# Patient Record
Sex: MALE | Race: WHITE | NOT HISPANIC OR LATINO | Employment: OTHER | URBAN - METROPOLITAN AREA
[De-identification: names, ages, dates, MRNs, and addresses within clinical notes are randomized per-mention and may not be internally consistent; named-entity substitution may affect disease eponyms.]

---

## 2024-01-09 ENCOUNTER — HOSPITAL ENCOUNTER (INPATIENT)
Facility: HOSPITAL | Age: 69
LOS: 6 days | DRG: 871 | End: 2024-01-15
Attending: EMERGENCY MEDICINE | Admitting: INTERNAL MEDICINE
Payer: COMMERCIAL

## 2024-01-09 ENCOUNTER — APPOINTMENT (OUTPATIENT)
Dept: RADIOLOGY | Facility: HOSPITAL | Age: 69
DRG: 871 | End: 2024-01-09
Payer: COMMERCIAL

## 2024-01-09 ENCOUNTER — APPOINTMENT (OUTPATIENT)
Dept: CARDIOLOGY | Facility: HOSPITAL | Age: 69
DRG: 871 | End: 2024-01-09
Payer: COMMERCIAL

## 2024-01-09 DIAGNOSIS — N17.9 ACUTE KIDNEY INJURY (CMS-HCC): ICD-10-CM

## 2024-01-09 DIAGNOSIS — J18.9 PNEUMONIA OF RIGHT LOWER LOBE DUE TO INFECTIOUS ORGANISM: ICD-10-CM

## 2024-01-09 DIAGNOSIS — I48.91 ATRIAL FIBRILLATION WITH RVR (MULTI): ICD-10-CM

## 2024-01-09 DIAGNOSIS — J96.01 ACUTE RESPIRATORY FAILURE WITH HYPOXIA (MULTI): Primary | ICD-10-CM

## 2024-01-09 DIAGNOSIS — J10.1 INFLUENZA A: ICD-10-CM

## 2024-01-09 LAB
ABO GROUP (TYPE) IN BLOOD: NORMAL
ALBUMIN SERPL BCP-MCNC: 3.2 G/DL (ref 3.4–5)
ALP SERPL-CCNC: 44 U/L (ref 33–136)
ALT SERPL W P-5'-P-CCNC: 66 U/L (ref 10–52)
AMORPH CRY #/AREA UR COMP ASSIST: ABNORMAL /HPF
ANION GAP BLDV CALCULATED.4IONS-SCNC: 21 MMOL/L (ref 10–25)
ANION GAP SERPL CALC-SCNC: 28 MMOL/L (ref 10–20)
ANTIBODY SCREEN: NORMAL
APPEARANCE UR: ABNORMAL
APTT PPP: 27 SECONDS (ref 27–38)
AST SERPL W P-5'-P-CCNC: 165 U/L (ref 9–39)
B-OH-BUTYR SERPL-SCNC: 0.38 MMOL/L (ref 0.02–0.27)
BACTERIA #/AREA URNS AUTO: ABNORMAL /HPF
BASE EXCESS BLDA CALC-SCNC: -3.5 MMOL/L (ref -2–3)
BASE EXCESS BLDV CALC-SCNC: -5.1 MMOL/L (ref -2–3)
BASOPHILS # BLD MANUAL: 0 X10*3/UL (ref 0–0.1)
BASOPHILS NFR BLD MANUAL: 0 %
BILIRUB SERPL-MCNC: 2.9 MG/DL (ref 0–1.2)
BILIRUB UR STRIP.AUTO-MCNC: NEGATIVE MG/DL
BNP SERPL-MCNC: 243 PG/ML (ref 0–99)
BODY TEMPERATURE: 37 DEGREES CELSIUS
BODY TEMPERATURE: 37 DEGREES CELSIUS
BUN SERPL-MCNC: 64 MG/DL (ref 6–23)
BURR CELLS BLD QL SMEAR: ABNORMAL
CA-I BLDV-SCNC: 1.11 MMOL/L (ref 1.1–1.33)
CALCIUM SERPL-MCNC: 9.1 MG/DL (ref 8.6–10.3)
CARDIAC TROPONIN I PNL SERPL HS: 21 NG/L (ref 0–20)
CHLORIDE BLDV-SCNC: 96 MMOL/L (ref 98–107)
CHLORIDE SERPL-SCNC: 94 MMOL/L (ref 98–107)
CO2 SERPL-SCNC: 19 MMOL/L (ref 21–32)
COLOR UR: ABNORMAL
CREAT SERPL-MCNC: 2.06 MG/DL (ref 0.5–1.3)
CRITICAL CALL TIME: 115
CRITICAL CALLED BY: ABNORMAL
CRITICAL CALLED TO: ABNORMAL
CRITICAL READ BACK: ABNORMAL
DACRYOCYTES BLD QL SMEAR: ABNORMAL
EGFRCR SERPLBLD CKD-EPI 2021: 34 ML/MIN/1.73M*2
EOSINOPHIL # BLD MANUAL: 0 X10*3/UL (ref 0–0.7)
EOSINOPHIL NFR BLD MANUAL: 0 %
ERYTHROCYTE [DISTWIDTH] IN BLOOD BY AUTOMATED COUNT: 13.2 % (ref 11.5–14.5)
FLUAV RNA RESP QL NAA+PROBE: DETECTED
FLUBV RNA RESP QL NAA+PROBE: NOT DETECTED
GIANT PLATELETS BLD QL SMEAR: ABNORMAL
GLUCOSE BLD MANUAL STRIP-MCNC: 114 MG/DL (ref 74–99)
GLUCOSE BLD MANUAL STRIP-MCNC: 128 MG/DL (ref 74–99)
GLUCOSE BLD MANUAL STRIP-MCNC: 36 MG/DL (ref 74–99)
GLUCOSE BLD MANUAL STRIP-MCNC: 94 MG/DL (ref 74–99)
GLUCOSE BLDV-MCNC: 45 MG/DL (ref 74–99)
GLUCOSE SERPL-MCNC: 43 MG/DL (ref 74–99)
GLUCOSE UR STRIP.AUTO-MCNC: NEGATIVE MG/DL
GRAN CASTS #/AREA UR COMP ASSIST: ABNORMAL /LPF
HCO3 BLDA-SCNC: 22.1 MMOL/L (ref 22–26)
HCO3 BLDV-SCNC: 22.7 MMOL/L (ref 22–26)
HCT VFR BLD AUTO: 47.4 % (ref 41–52)
HCT VFR BLD EST: 36 % (ref 41–52)
HGB BLD-MCNC: 15.8 G/DL (ref 13.5–17.5)
HGB BLDV-MCNC: 12 G/DL (ref 13.5–17.5)
HOLD SPECIMEN: NORMAL
HYALINE CASTS #/AREA URNS AUTO: ABNORMAL /LPF
IMM GRANULOCYTES # BLD AUTO: 0.24 X10*3/UL (ref 0–0.7)
IMM GRANULOCYTES NFR BLD AUTO: 5.9 % (ref 0–0.9)
INHALED O2 CONCENTRATION: 100 %
INHALED O2 CONCENTRATION: 100 %
INR PPP: 1.5 (ref 0.9–1.1)
KETONES UR STRIP.AUTO-MCNC: NEGATIVE MG/DL
LACTATE BLDV-SCNC: 15.4 MMOL/L (ref 0.4–2)
LACTATE SERPL-SCNC: 10.8 MMOL/L (ref 0.4–2)
LACTATE SERPL-SCNC: 12.8 MMOL/L (ref 0.4–2)
LACTATE SERPL-SCNC: 5.5 MMOL/L (ref 0.4–2)
LACTATE SERPL-SCNC: 5.9 MMOL/L (ref 0.4–2)
LEUKOCYTE ESTERASE UR QL STRIP.AUTO: NEGATIVE
LYMPHOCYTES # BLD MANUAL: 0.49 X10*3/UL (ref 1.2–4.8)
LYMPHOCYTES NFR BLD MANUAL: 12 %
MAGNESIUM SERPL-MCNC: 3.72 MG/DL (ref 1.6–2.4)
MCH RBC QN AUTO: 31.7 PG (ref 26–34)
MCHC RBC AUTO-ENTMCNC: 33.3 G/DL (ref 32–36)
MCV RBC AUTO: 95 FL (ref 80–100)
METAMYELOCYTES # BLD MANUAL: 0.37 X10*3/UL
METAMYELOCYTES NFR BLD MANUAL: 9 %
MONOCYTES # BLD MANUAL: 0.86 X10*3/UL (ref 0.1–1)
MONOCYTES NFR BLD MANUAL: 21 %
MUCOUS THREADS #/AREA URNS AUTO: ABNORMAL /LPF
MYELOCYTES # BLD MANUAL: 0.04 X10*3/UL
MYELOCYTES NFR BLD MANUAL: 1 %
NEUTROPHILS # BLD MANUAL: 2.3 X10*3/UL (ref 1.2–7.7)
NEUTS BAND # BLD MANUAL: 1.64 X10*3/UL (ref 0–0.7)
NEUTS BAND NFR BLD MANUAL: 40 %
NEUTS SEG # BLD MANUAL: 0.66 X10*3/UL (ref 1.2–7)
NEUTS SEG NFR BLD MANUAL: 16 %
NEUTS VAC BLD QL SMEAR: PRESENT
NITRITE UR QL STRIP.AUTO: NEGATIVE
NRBC BLD-RTO: 5.1 /100 WBCS (ref 0–0)
OXYHGB MFR BLDA: 86.6 % (ref 94–98)
OXYHGB MFR BLDV: 23.8 % (ref 45–75)
PCO2 BLDA: 41 MM HG (ref 38–42)
PCO2 BLDV: 53 MM HG (ref 41–51)
PH BLDA: 7.34 PH (ref 7.38–7.42)
PH BLDV: 7.24 PH (ref 7.33–7.43)
PH UR STRIP.AUTO: 5 [PH]
PHOSPHATE SERPL-MCNC: 6.9 MG/DL (ref 2.5–4.9)
PLATELET # BLD AUTO: 210 X10*3/UL (ref 150–450)
PLATELET CLUMP BLD QL SMEAR: PRESENT
PO2 BLDA: 58 MM HG (ref 85–95)
PO2 BLDV: 24 MM HG (ref 35–45)
POTASSIUM BLDV-SCNC: 5.3 MMOL/L (ref 3.5–5.3)
POTASSIUM SERPL-SCNC: 4.3 MMOL/L (ref 3.5–5.3)
PROCALCITONIN SERPL-MCNC: 88.77 NG/ML
PROT SERPL-MCNC: 6.7 G/DL (ref 6.4–8.2)
PROT UR STRIP.AUTO-MCNC: ABNORMAL MG/DL
PROTHROMBIN TIME: 16.5 SECONDS (ref 9.8–12.8)
RBC # BLD AUTO: 4.98 X10*6/UL (ref 4.5–5.9)
RBC # UR STRIP.AUTO: NEGATIVE /UL
RBC #/AREA URNS AUTO: ABNORMAL /HPF
RBC MORPH BLD: ABNORMAL
RH FACTOR (ANTIGEN D): NORMAL
SAO2 % BLDA: 89 % (ref 94–100)
SAO2 % BLDV: 24 % (ref 45–75)
SARS-COV-2 RNA RESP QL NAA+PROBE: NOT DETECTED
SODIUM BLDV-SCNC: 134 MMOL/L (ref 136–145)
SODIUM SERPL-SCNC: 137 MMOL/L (ref 136–145)
SP GR UR STRIP.AUTO: 1.02
SPECIMEN DRAWN FROM PATIENT: ABNORMAL
TOTAL CELLS COUNTED BLD: 100
UROBILINOGEN UR STRIP.AUTO-MCNC: 4 MG/DL
VARIANT LYMPHS # BLD MANUAL: 0.04 X10*3/UL (ref 0–0.5)
VARIANT LYMPHS NFR BLD: 1 %
WBC # BLD AUTO: 4.1 X10*3/UL (ref 4.4–11.3)
WBC #/AREA URNS AUTO: ABNORMAL /HPF

## 2024-01-09 PROCEDURE — 85027 COMPLETE CBC AUTOMATED: CPT | Performed by: EMERGENCY MEDICINE

## 2024-01-09 PROCEDURE — 96375 TX/PRO/DX INJ NEW DRUG ADDON: CPT

## 2024-01-09 PROCEDURE — 87899 AGENT NOS ASSAY W/OPTIC: CPT | Mod: PARLAB | Performed by: INTERNAL MEDICINE

## 2024-01-09 PROCEDURE — 85007 BL SMEAR W/DIFF WBC COUNT: CPT | Performed by: EMERGENCY MEDICINE

## 2024-01-09 PROCEDURE — 84100 ASSAY OF PHOSPHORUS: CPT | Performed by: EMERGENCY MEDICINE

## 2024-01-09 PROCEDURE — 87449 NOS EACH ORGANISM AG IA: CPT | Mod: PARLAB | Performed by: INTERNAL MEDICINE

## 2024-01-09 PROCEDURE — 82010 KETONE BODYS QUAN: CPT | Performed by: EMERGENCY MEDICINE

## 2024-01-09 PROCEDURE — 84484 ASSAY OF TROPONIN QUANT: CPT | Performed by: EMERGENCY MEDICINE

## 2024-01-09 PROCEDURE — 2500000004 HC RX 250 GENERAL PHARMACY W/ HCPCS (ALT 636 FOR OP/ED)

## 2024-01-09 PROCEDURE — 81001 URINALYSIS AUTO W/SCOPE: CPT | Performed by: EMERGENCY MEDICINE

## 2024-01-09 PROCEDURE — 74176 CT ABD & PELVIS W/O CONTRAST: CPT

## 2024-01-09 PROCEDURE — 2500000002 HC RX 250 W HCPCS SELF ADMINISTERED DRUGS (ALT 637 FOR MEDICARE OP, ALT 636 FOR OP/ED)

## 2024-01-09 PROCEDURE — 2500000002 HC RX 250 W HCPCS SELF ADMINISTERED DRUGS (ALT 637 FOR MEDICARE OP, ALT 636 FOR OP/ED): Performed by: STUDENT IN AN ORGANIZED HEALTH CARE EDUCATION/TRAINING PROGRAM

## 2024-01-09 PROCEDURE — 87636 SARSCOV2 & INF A&B AMP PRB: CPT | Performed by: EMERGENCY MEDICINE

## 2024-01-09 PROCEDURE — 87040 BLOOD CULTURE FOR BACTERIA: CPT | Mod: PARLAB,59 | Performed by: EMERGENCY MEDICINE

## 2024-01-09 PROCEDURE — 2500000004 HC RX 250 GENERAL PHARMACY W/ HCPCS (ALT 636 FOR OP/ED): Performed by: STUDENT IN AN ORGANIZED HEALTH CARE EDUCATION/TRAINING PROGRAM

## 2024-01-09 PROCEDURE — 94640 AIRWAY INHALATION TREATMENT: CPT

## 2024-01-09 PROCEDURE — 84132 ASSAY OF SERUM POTASSIUM: CPT | Performed by: EMERGENCY MEDICINE

## 2024-01-09 PROCEDURE — 36415 COLL VENOUS BLD VENIPUNCTURE: CPT | Performed by: STUDENT IN AN ORGANIZED HEALTH CARE EDUCATION/TRAINING PROGRAM

## 2024-01-09 PROCEDURE — 94667 MNPJ CHEST WALL 1ST: CPT

## 2024-01-09 PROCEDURE — 96374 THER/PROPH/DIAG INJ IV PUSH: CPT

## 2024-01-09 PROCEDURE — 71045 X-RAY EXAM CHEST 1 VIEW: CPT | Performed by: RADIOLOGY

## 2024-01-09 PROCEDURE — 83605 ASSAY OF LACTIC ACID: CPT | Performed by: EMERGENCY MEDICINE

## 2024-01-09 PROCEDURE — 36415 COLL VENOUS BLD VENIPUNCTURE: CPT | Performed by: EMERGENCY MEDICINE

## 2024-01-09 PROCEDURE — 2020000001 HC ICU ROOM DAILY

## 2024-01-09 PROCEDURE — 94660 CPAP INITIATION&MGMT: CPT

## 2024-01-09 PROCEDURE — 36415 COLL VENOUS BLD VENIPUNCTURE: CPT | Performed by: INTERNAL MEDICINE

## 2024-01-09 PROCEDURE — 83605 ASSAY OF LACTIC ACID: CPT | Performed by: STUDENT IN AN ORGANIZED HEALTH CARE EDUCATION/TRAINING PROGRAM

## 2024-01-09 PROCEDURE — 74176 CT ABD & PELVIS W/O CONTRAST: CPT | Performed by: RADIOLOGY

## 2024-01-09 PROCEDURE — 36600 WITHDRAWAL OF ARTERIAL BLOOD: CPT

## 2024-01-09 PROCEDURE — 71250 CT THORAX DX C-: CPT | Performed by: RADIOLOGY

## 2024-01-09 PROCEDURE — 84145 PROCALCITONIN (PCT): CPT | Mod: PARLAB | Performed by: INTERNAL MEDICINE

## 2024-01-09 PROCEDURE — 71045 X-RAY EXAM CHEST 1 VIEW: CPT

## 2024-01-09 PROCEDURE — 96361 HYDRATE IV INFUSION ADD-ON: CPT

## 2024-01-09 PROCEDURE — 71045 X-RAY EXAM CHEST 1 VIEW: CPT | Performed by: STUDENT IN AN ORGANIZED HEALTH CARE EDUCATION/TRAINING PROGRAM

## 2024-01-09 PROCEDURE — 82947 ASSAY GLUCOSE BLOOD QUANT: CPT

## 2024-01-09 PROCEDURE — 85610 PROTHROMBIN TIME: CPT | Performed by: EMERGENCY MEDICINE

## 2024-01-09 PROCEDURE — 87081 CULTURE SCREEN ONLY: CPT | Mod: PARLAB | Performed by: INTERNAL MEDICINE

## 2024-01-09 PROCEDURE — 2500000005 HC RX 250 GENERAL PHARMACY W/O HCPCS: Performed by: STUDENT IN AN ORGANIZED HEALTH CARE EDUCATION/TRAINING PROGRAM

## 2024-01-09 PROCEDURE — 82805 BLOOD GASES W/O2 SATURATION: CPT | Performed by: INTERNAL MEDICINE

## 2024-01-09 PROCEDURE — 83880 ASSAY OF NATRIURETIC PEPTIDE: CPT | Performed by: EMERGENCY MEDICINE

## 2024-01-09 PROCEDURE — 85730 THROMBOPLASTIN TIME PARTIAL: CPT | Performed by: EMERGENCY MEDICINE

## 2024-01-09 PROCEDURE — 93005 ELECTROCARDIOGRAM TRACING: CPT

## 2024-01-09 PROCEDURE — 83735 ASSAY OF MAGNESIUM: CPT | Performed by: EMERGENCY MEDICINE

## 2024-01-09 PROCEDURE — 5A0935A ASSISTANCE WITH RESPIRATORY VENTILATION, LESS THAN 24 CONSECUTIVE HOURS, HIGH NASAL FLOW/VELOCITY: ICD-10-PCS | Performed by: INTERNAL MEDICINE

## 2024-01-09 PROCEDURE — 2500000005 HC RX 250 GENERAL PHARMACY W/O HCPCS

## 2024-01-09 PROCEDURE — 2500000004 HC RX 250 GENERAL PHARMACY W/ HCPCS (ALT 636 FOR OP/ED): Performed by: EMERGENCY MEDICINE

## 2024-01-09 PROCEDURE — 2500000004 HC RX 250 GENERAL PHARMACY W/ HCPCS (ALT 636 FOR OP/ED): Performed by: INTERNAL MEDICINE

## 2024-01-09 PROCEDURE — 86850 RBC ANTIBODY SCREEN: CPT | Performed by: EMERGENCY MEDICINE

## 2024-01-09 PROCEDURE — 2500000002 HC RX 250 W HCPCS SELF ADMINISTERED DRUGS (ALT 637 FOR MEDICARE OP, ALT 636 FOR OP/ED): Performed by: EMERGENCY MEDICINE

## 2024-01-09 PROCEDURE — 2500000005 HC RX 250 GENERAL PHARMACY W/O HCPCS: Performed by: EMERGENCY MEDICINE

## 2024-01-09 PROCEDURE — 99291 CRITICAL CARE FIRST HOUR: CPT | Mod: 25 | Performed by: EMERGENCY MEDICINE

## 2024-01-09 RX ORDER — PHENOBARBITAL SODIUM 65 MG/ML
65 INJECTION, SOLUTION INTRAMUSCULAR; INTRAVENOUS EVERY 12 HOURS
Status: DISCONTINUED | OUTPATIENT
Start: 2024-01-10 | End: 2024-01-10

## 2024-01-09 RX ORDER — BUPRENORPHINE HYDROCHLORIDE AND NALOXONE HYDROCHLORIDE 8.6; 2.1 MG/1; MG/1
2 TABLET, ORALLY DISINTEGRATING SUBLINGUAL
COMMUNITY
Start: 2023-12-29 | End: 2024-01-12

## 2024-01-09 RX ORDER — IBUPROFEN 200 MG
1 TABLET ORAL DAILY
Status: DISCONTINUED | OUTPATIENT
Start: 2024-01-09 | End: 2024-01-15

## 2024-01-09 RX ORDER — PHENOBARBITAL SODIUM 65 MG/ML
130 INJECTION, SOLUTION INTRAMUSCULAR; INTRAVENOUS ONCE
Status: COMPLETED | OUTPATIENT
Start: 2024-01-09 | End: 2024-01-09

## 2024-01-09 RX ORDER — DEXMEDETOMIDINE HYDROCHLORIDE 4 UG/ML
.1-1.5 INJECTION, SOLUTION INTRAVENOUS CONTINUOUS
Status: DISCONTINUED | OUTPATIENT
Start: 2024-01-09 | End: 2024-01-09

## 2024-01-09 RX ORDER — DEXTROSE MONOHYDRATE 100 MG/ML
100 INJECTION, SOLUTION INTRAVENOUS CONTINUOUS
Status: DISCONTINUED | OUTPATIENT
Start: 2024-01-09 | End: 2024-01-09

## 2024-01-09 RX ORDER — DILTIAZEM HYDROCHLORIDE 5 MG/ML
0.25 INJECTION INTRAVENOUS ONCE
Status: COMPLETED | OUTPATIENT
Start: 2024-01-09 | End: 2024-01-09

## 2024-01-09 RX ORDER — IPRATROPIUM BROMIDE AND ALBUTEROL SULFATE 2.5; .5 MG/3ML; MG/3ML
3 SOLUTION RESPIRATORY (INHALATION) ONCE
Status: COMPLETED | OUTPATIENT
Start: 2024-01-09 | End: 2024-01-09

## 2024-01-09 RX ORDER — DEXTROSE, SODIUM CHLORIDE, SODIUM LACTATE, POTASSIUM CHLORIDE, AND CALCIUM CHLORIDE 5; .6; .31; .03; .02 G/100ML; G/100ML; G/100ML; G/100ML; G/100ML
50 INJECTION, SOLUTION INTRAVENOUS CONTINUOUS
Status: DISCONTINUED | OUTPATIENT
Start: 2024-01-09 | End: 2024-01-09

## 2024-01-09 RX ORDER — DEXMEDETOMIDINE HYDROCHLORIDE 4 UG/ML
.1-1.5 INJECTION, SOLUTION INTRAVENOUS CONTINUOUS
Status: DISCONTINUED | OUTPATIENT
Start: 2024-01-09 | End: 2024-01-09 | Stop reason: SDUPTHER

## 2024-01-09 RX ORDER — BUPRENORPHINE AND NALOXONE 8; 2 MG/1; MG/1
1 FILM, SOLUBLE BUCCAL; SUBLINGUAL 2 TIMES DAILY PRN
Status: DISCONTINUED | OUTPATIENT
Start: 2024-01-09 | End: 2024-01-09

## 2024-01-09 RX ORDER — METOPROLOL TARTRATE 1 MG/ML
5 INJECTION, SOLUTION INTRAVENOUS ONCE
Status: COMPLETED | OUTPATIENT
Start: 2024-01-09 | End: 2024-01-09

## 2024-01-09 RX ORDER — OSELTAMIVIR PHOSPHATE 30 MG/1
30 CAPSULE ORAL DAILY
Status: DISCONTINUED | OUTPATIENT
Start: 2024-01-09 | End: 2024-01-11

## 2024-01-09 RX ORDER — DEXTROSE MONOHYDRATE 100 MG/ML
0.3 INJECTION, SOLUTION INTRAVENOUS ONCE AS NEEDED
Status: DISCONTINUED | OUTPATIENT
Start: 2024-01-09 | End: 2024-01-15

## 2024-01-09 RX ORDER — PHENOBARBITAL SODIUM 65 MG/ML
65 INJECTION, SOLUTION INTRAMUSCULAR; INTRAVENOUS EVERY 12 HOURS
Status: DISCONTINUED | OUTPATIENT
Start: 2024-01-09 | End: 2024-01-09

## 2024-01-09 RX ORDER — DIGOXIN 0.25 MG/ML
125 INJECTION INTRAMUSCULAR; INTRAVENOUS DAILY
Status: DISCONTINUED | OUTPATIENT
Start: 2024-01-10 | End: 2024-01-09

## 2024-01-09 RX ORDER — DEXTROSE AND SODIUM CHLORIDE 10; .45 G/100ML; G/100ML
100 INJECTION, SOLUTION INTRAVENOUS CONTINUOUS
Status: DISCONTINUED | OUTPATIENT
Start: 2024-01-09 | End: 2024-01-09

## 2024-01-09 RX ORDER — DEXMEDETOMIDINE HYDROCHLORIDE 4 UG/ML
0.2 INJECTION, SOLUTION INTRAVENOUS CONTINUOUS
Status: DISCONTINUED | OUTPATIENT
Start: 2024-01-09 | End: 2024-01-09

## 2024-01-09 RX ORDER — BUPRENORPHINE HYDROCHLORIDE 8 MG/1
8 TABLET SUBLINGUAL 2 TIMES DAILY
Status: DISCONTINUED | OUTPATIENT
Start: 2024-01-09 | End: 2024-01-13

## 2024-01-09 RX ORDER — DEXMEDETOMIDINE HYDROCHLORIDE 4 UG/ML
.1-1.5 INJECTION, SOLUTION INTRAVENOUS CONTINUOUS
Status: DISCONTINUED | OUTPATIENT
Start: 2024-01-09 | End: 2024-01-09 | Stop reason: DRUGHIGH

## 2024-01-09 RX ORDER — DILTIAZEM HCL/D5W 125 MG/125
10 PLASTIC BAG, INJECTION (ML) INTRAVENOUS CONTINUOUS
Status: DISCONTINUED | OUTPATIENT
Start: 2024-01-09 | End: 2024-01-09

## 2024-01-09 RX ORDER — BUPRENORPHINE HYDROCHLORIDE 8 MG/1
8 TABLET SUBLINGUAL 2 TIMES DAILY PRN
Status: DISCONTINUED | OUTPATIENT
Start: 2024-01-09 | End: 2024-01-09

## 2024-01-09 RX ORDER — DEXTROSE 50 % IN WATER (D50W) INTRAVENOUS SYRINGE
25
Status: DISCONTINUED | OUTPATIENT
Start: 2024-01-09 | End: 2024-01-15

## 2024-01-09 RX ORDER — DIGOXIN 0.25 MG/ML
250 INJECTION INTRAMUSCULAR; INTRAVENOUS ONCE
Status: DISCONTINUED | OUTPATIENT
Start: 2024-01-09 | End: 2024-01-09

## 2024-01-09 RX ORDER — DEXTROSE 50 % IN WATER (D50W) INTRAVENOUS SYRINGE
25 ONCE
Status: COMPLETED | OUTPATIENT
Start: 2024-01-09 | End: 2024-01-09

## 2024-01-09 RX ORDER — LIDOCAINE 560 MG/1
1 PATCH PERCUTANEOUS; TOPICAL; TRANSDERMAL DAILY
Status: DISCONTINUED | OUTPATIENT
Start: 2024-01-09 | End: 2024-01-15

## 2024-01-09 RX ORDER — FENTANYL CITRATE 50 UG/ML
50 INJECTION, SOLUTION INTRAMUSCULAR; INTRAVENOUS ONCE
Status: COMPLETED | OUTPATIENT
Start: 2024-01-09 | End: 2024-01-09

## 2024-01-09 RX ORDER — PHENOBARBITAL SODIUM 65 MG/ML
32.5 INJECTION, SOLUTION INTRAMUSCULAR; INTRAVENOUS EVERY 4 HOURS PRN
Status: DISCONTINUED | OUTPATIENT
Start: 2024-01-09 | End: 2024-01-10

## 2024-01-09 RX ORDER — CEFTRIAXONE 1 G/50ML
1 INJECTION, SOLUTION INTRAVENOUS EVERY 24 HOURS
Status: DISCONTINUED | OUTPATIENT
Start: 2024-01-09 | End: 2024-01-10

## 2024-01-09 RX ORDER — HEPARIN SODIUM 5000 [USP'U]/ML
5000 INJECTION, SOLUTION INTRAVENOUS; SUBCUTANEOUS EVERY 8 HOURS SCHEDULED
Status: DISCONTINUED | OUTPATIENT
Start: 2024-01-09 | End: 2024-01-15

## 2024-01-09 RX ORDER — DIGOXIN 0.25 MG/ML
500 INJECTION INTRAMUSCULAR; INTRAVENOUS ONCE
Status: COMPLETED | OUTPATIENT
Start: 2024-01-09 | End: 2024-01-09

## 2024-01-09 RX ORDER — SODIUM CHLORIDE, SODIUM LACTATE, POTASSIUM CHLORIDE, CALCIUM CHLORIDE 600; 310; 30; 20 MG/100ML; MG/100ML; MG/100ML; MG/100ML
250 INJECTION, SOLUTION INTRAVENOUS CONTINUOUS
Status: ACTIVE | OUTPATIENT
Start: 2024-01-09 | End: 2024-01-09

## 2024-01-09 RX ORDER — SODIUM CHLORIDE, SODIUM LACTATE, POTASSIUM CHLORIDE, CALCIUM CHLORIDE 600; 310; 30; 20 MG/100ML; MG/100ML; MG/100ML; MG/100ML
1000 INJECTION, SOLUTION INTRAVENOUS CONTINUOUS
Status: ACTIVE | OUTPATIENT
Start: 2024-01-09 | End: 2024-01-09

## 2024-01-09 RX ADMIN — DEXMEDETOMIDINE HYDROCHLORIDE 0.2 MCG/KG/HR: 400 INJECTION INTRAVENOUS at 09:10

## 2024-01-09 RX ADMIN — DILTIAZEM HYDROCHLORIDE 13.5 MG: 5 INJECTION, SOLUTION INTRAVENOUS at 01:02

## 2024-01-09 RX ADMIN — LIDOCAINE 1 PATCH: 4 PATCH TOPICAL at 09:15

## 2024-01-09 RX ADMIN — BUPRENORPHINE 8 MG: 8 TABLET SUBLINGUAL at 09:24

## 2024-01-09 RX ADMIN — THIAMINE HYDROCHLORIDE 500 MG: 100 INJECTION, SOLUTION INTRAMUSCULAR; INTRAVENOUS at 13:49

## 2024-01-09 RX ADMIN — CEFTRIAXONE SODIUM 1 G: 1 INJECTION, SOLUTION INTRAVENOUS at 13:49

## 2024-01-09 RX ADMIN — SODIUM CHLORIDE, POTASSIUM CHLORIDE, SODIUM LACTATE AND CALCIUM CHLORIDE 250 ML/HR: 600; 310; 30; 20 INJECTION, SOLUTION INTRAVENOUS at 16:00

## 2024-01-09 RX ADMIN — IPRATROPIUM BROMIDE AND ALBUTEROL SULFATE 3 ML: .5; 3 SOLUTION RESPIRATORY (INHALATION) at 01:08

## 2024-01-09 RX ADMIN — HEPARIN SODIUM 5000 UNITS: 5000 INJECTION INTRAVENOUS; SUBCUTANEOUS at 09:15

## 2024-01-09 RX ADMIN — DEXTROSE MONOHYDRATE 50 ML: 25 INJECTION, SOLUTION INTRAVENOUS at 01:19

## 2024-01-09 RX ADMIN — HEPARIN SODIUM 5000 UNITS: 5000 INJECTION INTRAVENOUS; SUBCUTANEOUS at 21:22

## 2024-01-09 RX ADMIN — SODIUM CHLORIDE, POTASSIUM CHLORIDE, SODIUM LACTATE AND CALCIUM CHLORIDE 1000 ML: 600; 310; 30; 20 INJECTION, SOLUTION INTRAVENOUS at 01:51

## 2024-01-09 RX ADMIN — BUPRENORPHINE 8 MG: 8 TABLET SUBLINGUAL at 21:22

## 2024-01-09 RX ADMIN — METOPROLOL TARTRATE 5 MG: 5 INJECTION INTRAVENOUS at 09:24

## 2024-01-09 RX ADMIN — Medication 10 MG/HR: at 01:06

## 2024-01-09 RX ADMIN — HYDROCORTISONE SODIUM SUCCINATE 100 MG: 100 INJECTION, POWDER, FOR SOLUTION INTRAMUSCULAR; INTRAVENOUS at 18:11

## 2024-01-09 RX ADMIN — HEPARIN SODIUM 5000 UNITS: 5000 INJECTION INTRAVENOUS; SUBCUTANEOUS at 15:59

## 2024-01-09 RX ADMIN — PHENOBARBITAL SODIUM 130 MG: 65 INJECTION INTRAMUSCULAR at 11:05

## 2024-01-09 RX ADMIN — HYDROMORPHONE HYDROCHLORIDE 0.5 MG: 1 INJECTION, SOLUTION INTRAMUSCULAR; INTRAVENOUS; SUBCUTANEOUS at 02:55

## 2024-01-09 RX ADMIN — VANCOMYCIN HYDROCHLORIDE 1250 MG: 1.25 INJECTION, POWDER, LYOPHILIZED, FOR SOLUTION INTRAVENOUS at 02:36

## 2024-01-09 RX ADMIN — AZITHROMYCIN MONOHYDRATE 500 MG: 500 INJECTION, POWDER, LYOPHILIZED, FOR SOLUTION INTRAVENOUS at 13:48

## 2024-01-09 RX ADMIN — SODIUM CHLORIDE, SODIUM LACTATE, POTASSIUM CHLORIDE, AND CALCIUM CHLORIDE 1000 ML/HR: 600; 310; 30; 20 INJECTION, SOLUTION INTRAVENOUS at 17:30

## 2024-01-09 RX ADMIN — OSELTAMAVIR PHOSPHATE 30 MG: 30 CAPSULE ORAL at 13:49

## 2024-01-09 RX ADMIN — SODIUM CHLORIDE, SODIUM LACTATE, POTASSIUM CHLORIDE, CALCIUM CHLORIDE AND DEXTROSE MONOHYDRATE 50 ML/HR: 5; 600; 310; 30; 20 INJECTION, SOLUTION INTRAVENOUS at 08:22

## 2024-01-09 RX ADMIN — PIPERACILLIN SODIUM AND TAZOBACTAM SODIUM 3.38 G: 3; .375 INJECTION, SOLUTION INTRAVENOUS at 08:27

## 2024-01-09 RX ADMIN — FENTANYL CITRATE 50 MCG: 50 INJECTION INTRAMUSCULAR; INTRAVENOUS at 02:31

## 2024-01-09 RX ADMIN — DIGOXIN 500 MCG: 0.25 INJECTION INTRAMUSCULAR; INTRAVENOUS at 09:24

## 2024-01-09 RX ADMIN — DILTIAZEM HYDROCHLORIDE 13.5 MG: 5 INJECTION, SOLUTION INTRAVENOUS at 00:53

## 2024-01-09 RX ADMIN — PIPERACILLIN SODIUM AND TAZOBACTAM SODIUM 3.38 G: 3; .375 INJECTION, SOLUTION INTRAVENOUS at 02:02

## 2024-01-09 RX ADMIN — SODIUM CHLORIDE, POTASSIUM CHLORIDE, SODIUM LACTATE AND CALCIUM CHLORIDE 1000 ML: 600; 310; 30; 20 INJECTION, SOLUTION INTRAVENOUS at 01:00

## 2024-01-09 ASSESSMENT — PAIN DESCRIPTION - ORIENTATION: ORIENTATION: LEFT

## 2024-01-09 ASSESSMENT — LIFESTYLE VARIABLES
TREMOR: NOT VISIBLE, BUT CAN BE FELT FINGERTIP TO FINGERTIP
ANXIETY: MILDLY ANXIOUS
PAROXYSMAL SWEATS: 3
VISUAL DISTURBANCES: NOT PRESENT
REASON UNABLE TO ASSESS: NO
AUDITORY DISTURBANCES: NOT PRESENT
ORIENTATION AND CLOUDING OF SENSORIUM: CANNOT DO SERIAL ADDITIONS OR IS UNCERTAIN ABOUT DATE
EVER HAD A DRINK FIRST THING IN THE MORNING TO STEADY YOUR NERVES TO GET RID OF A HANGOVER: NO
AGITATION: SOMEWHAT MORE THAN NORMAL ACTIVITY
TOTAL SCORE: 13
AGITATION: MODERATELY FIDGETY AND RESTLESS
PAROXYSMAL SWEATS: NO SWEAT VISIBLE
HAVE PEOPLE ANNOYED YOU BY CRITICIZING YOUR DRINKING: NO
ORIENTATION AND CLOUDING OF SENSORIUM: DISORIENTED FOR PLACE OR PERSON
TREMOR: NO TREMOR
HAVE YOU EVER FELT YOU SHOULD CUT DOWN ON YOUR DRINKING: NO
EVER FELT BAD OR GUILTY ABOUT YOUR DRINKING: NO
NAUSEA AND VOMITING: NO NAUSEA AND NO VOMITING
VISUAL DISTURBANCES: NOT PRESENT
ANXIETY: MODERATELY ANXIOUS, OR GUARDED, SO ANXIETY IS INFERRED
AUDITORY DISTURBANCES: NOT PRESENT
HEADACHE, FULLNESS IN HEAD: NOT PRESENT
HEADACHE, FULLNESS IN HEAD: NOT PRESENT
TOTAL SCORE: 6
NAUSEA AND VOMITING: NO NAUSEA AND NO VOMITING

## 2024-01-09 ASSESSMENT — COLUMBIA-SUICIDE SEVERITY RATING SCALE - C-SSRS
2. HAVE YOU ACTUALLY HAD ANY THOUGHTS OF KILLING YOURSELF?: NO
1. IN THE PAST MONTH, HAVE YOU WISHED YOU WERE DEAD OR WISHED YOU COULD GO TO SLEEP AND NOT WAKE UP?: NO
6. HAVE YOU EVER DONE ANYTHING, STARTED TO DO ANYTHING, OR PREPARED TO DO ANYTHING TO END YOUR LIFE?: NO

## 2024-01-09 ASSESSMENT — COGNITIVE AND FUNCTIONAL STATUS - GENERAL
WALKING IN HOSPITAL ROOM: A LOT
HELP NEEDED FOR BATHING: A LITTLE
DRESSING REGULAR UPPER BODY CLOTHING: A LITTLE
CLIMB 3 TO 5 STEPS WITH RAILING: TOTAL
PERSONAL GROOMING: A LITTLE
TOILETING: A LITTLE
EATING MEALS: A LOT
DAILY ACTIVITIY SCORE: 17
STANDING UP FROM CHAIR USING ARMS: A LOT
DRESSING REGULAR LOWER BODY CLOTHING: A LITTLE
MOBILITY SCORE: 15
MOVING TO AND FROM BED TO CHAIR: A LOT

## 2024-01-09 ASSESSMENT — PAIN DESCRIPTION - LOCATION: LOCATION: CHEST

## 2024-01-09 ASSESSMENT — PAIN SCALES - GENERAL
PAINLEVEL_OUTOF10: 0 - NO PAIN
PAINLEVEL_OUTOF10: 10 - WORST POSSIBLE PAIN
PAINLEVEL_OUTOF10: 0 - NO PAIN
PAINLEVEL_OUTOF10: 10 - WORST POSSIBLE PAIN

## 2024-01-09 ASSESSMENT — PAIN - FUNCTIONAL ASSESSMENT
PAIN_FUNCTIONAL_ASSESSMENT: CPOT (CRITICAL CARE PAIN OBSERVATION TOOL)
PAIN_FUNCTIONAL_ASSESSMENT: 0-10
PAIN_FUNCTIONAL_ASSESSMENT: 0-10

## 2024-01-09 ASSESSMENT — PAIN DESCRIPTION - DESCRIPTORS: DESCRIPTORS: ACHING

## 2024-01-09 NOTE — H&P
HPI  68-year-old with polysubstance abuse, hepatitis C presenting to hospital feeling short of breath.  Found to have influenza A and multifocal pneumonia.  He was also in atrial fibrillation with rapid ventricular rate.    Patient's laboratory workup on admission was significant for hyperglycemia, creatinine of 2, phosphorus 6.9, AST/ALT of 3/1, procalcitonin greater than 85, lactate greater than 10.  He does not have a leukocytosis however does have a significant left shift and bandemia.  He was started on high flow nasal cannula, diltiazem, admitted to MICU.    On examination the patient is extremely cachectic and is difficult to redirect, answering questions appropriately and inappropriately at variable intervals.  He tells me that he used to use IV drugs but he does not anymore.  He does use recreational drugs off the street however is not able to tell me which ones.  He does take buprenorphine as an outpatient but has not taken it today.    Difficult to obtain additional review of systems.  He will be admitted to the MICU for unstable respiratory status potentially early shock.    ROS  A complete 10 point review of systems was completed and is otherwise negative unless stated.      Physical exam  Physical Exam:   General appearance: Altered, cachectic, poorly redirectable, fidgety and anxious  HEENT: Edentulous  Neck: no obvious deformity, JVP WNL  Respiratory: Use of accessory muscles including scalenes intercostals, appropriate respiratory effort  Cardiovascular: Irregular tachycardia  Abdomen: no organomegaly, no tenderness to palpation in all quadrants  Extremities: Frail musculature  Skin: intact, no rashes   Neurologic: Tangential thoughts, fidgety and anxious, mildly tremulous, redirectable however at times uncooperative  Psych: Difficult to assess    Remainder of objective data including imaging and laboratory findings were all reviewed.    Assessment and plan:  Brief admission history:  68-year-old  with history of polysubstance abuse, hepatitis C.  Presented to hospital short of breath and feeling lethargic.  Elevated liver enzymes, low glucose, possible ZAMZAM.  Also significant pneumonia with possible parapneumonic effusion.  In atrial fibrillation with rapid ventricular rate, on oxygen.  Admitted to ICU in setting of multiorgan dysfunction.    Neurologic:  # Acute toxic encephalopathy  # Substance abuse  Continue buprenorphine  Possible withdrawal, patient did respond very well to phenobarbital  Thiamine supplementation    Cardiovascular:  # Atrial fibrillation with rapid ventricular rate  In the setting of numerous metabolic abnormalities  Patient actually converted to sinus rhythm afternoon of 1/9  [id number] warrants anticoagulation however not a pressing issue    Respiratory:  # Acute hypoxic respiratory failure  # Tobacco abuse  # Multifocal pneumonia with possible parapneumonic effusion  # Influenza A  Respiratory support with high flow oxygen alternating with nonrebreather  Will examine pleural effusion when patient is more stable  For now, ceftriaxone, azithromycin    Gastrointestinal:  # Hepatitis C  Unclear if treated or not, can check viral load however this does not change her clinical course    Renal:  # Possible ZAMZAM  # HAGMA  # Lactic acidosis  BladderScan as patient's urine output is scant  Suspect hemodynamic ZAMZAM    Hematologic/oncologic:  # Leukopenia  Insetting of infection    Infectious disease:  # Community-acquired pneumonia with likely parapneumonic effusion  If deemed to be empyema, can cover anaerobes  For now, ceftriaxone and azithromycin are appropriate  Await cultures  [employer]    Endocrine:  No issues    Tubes:-Nasal cannula  Lines: Peripheral IVs  Devices: None  Fluids: As needed  Antibiotics: Ceftriaxone, azithromycin  Prophylaxis: Heparin  CODE STATUS: Full    This is a prelim note, please await attending attestation.    Seth Echevarria, DO    This note was entered with the  assistance of dictation software, please excuse any grammatical errors or brevity.

## 2024-01-09 NOTE — ED PROVIDER NOTES
HPI   Chief Complaint   Patient presents with    Weakness, Gen     BIBA from home, family wanted him to come sooner but patient didn't want to.However, patient feeling worse with increased SOB and weakness. S/s for 1 week       Patient is a 68-year-old male, medical history significant for smoking who is on buprenorphine, presents to the emergency department for weakness and generalized malaise.  Patient states has been feeling ill for the past week.  Apparently, family has been telling him that he needs to come to the hospital but he has refused.  They finally called EMS today.  On EMS arrival, patient was markedly tachycardic with rates in the 180s.  He was also found to be significantly hypoxic.  Patient states that he has been coughing and has not been able to sleep because he has been feeling short of breath.  For the past 2 days, he had a hard time walking just because of generalized weakness.  He denies headache.  He denies visual change.  He denies any chest pain.  Patient does not follow closely with any physicians.                          Rea Coma Scale Score: 15                  Patient History   No past medical history on file.  No past surgical history on file.  No family history on file.  Social History     Tobacco Use    Smoking status: Not on file    Smokeless tobacco: Not on file   Substance Use Topics    Alcohol use: Not on file    Drug use: Not on file       Physical Exam   ED Triage Vitals   Temp Heart Rate Resp BP   01/09/24 0039 01/09/24 0039 01/09/24 0039 01/09/24 0041   36.2 °C (97.2 °F) (!) 181 (!) 36 133/79      SpO2 Temp Source Heart Rate Source Patient Position   -- 01/09/24 0039 01/09/24 0039 --    Temporal Monitor       BP Location FiO2 (%)     -- --             Physical Exam  Vitals and nursing note reviewed.   Constitutional:       General: He is in acute distress.      Appearance: He is well-developed. He is cachectic. He is ill-appearing.   HENT:      Head: Normocephalic and  atraumatic.   Eyes:      Extraocular Movements: Extraocular movements intact.      Conjunctiva/sclera: Conjunctivae normal.      Pupils: Pupils are equal, round, and reactive to light.   Cardiovascular:      Rate and Rhythm: Tachycardia present. Rhythm irregular.      Heart sounds: No murmur heard.  Pulmonary:      Effort: Pulmonary effort is normal. No respiratory distress.      Breath sounds: Wheezing present.   Abdominal:      Palpations: Abdomen is soft.      Tenderness: There is no abdominal tenderness.   Musculoskeletal:         General: No swelling.      Cervical back: Neck supple.   Skin:     General: Skin is warm and dry.      Capillary Refill: Capillary refill takes less than 2 seconds.   Neurological:      General: No focal deficit present.      Mental Status: He is alert.   Psychiatric:         Mood and Affect: Mood normal.         ED Course & MDM   ED Course as of 01/09/24 0241 Tue Jan 09, 2024   0050 EKG demonstrates atrial fibrillation with rapid ventricular response.  Rate of 176.  Rate dependent ST depression.  No STEMI. [MK]   0050 I did discuss goals of care with patient.  He states that what ever needs done he is agreeable to including CPR and mechanical ventilation. [MK]   0129 Blood gases show acidosis.  There is markedly elevated lactic acid.  Blood sugar was 45 and this was confirmed on fingerstick.  Patient was given an amp of D50. [MK]   0129 Coags mildly elevated. [MK]   0133 BNP mildly elevated at 243 [MK]   0133 Troponin 21. [MK]   0139 CBC demonstrates mild leukopenia.  Platelet count normal.  Hemoglobin normal. [MK]   0139 Metabolic panel demonstrates evidence of acute kidney injury.  There is an elevated anion gap.  There is diminished bicarb. [MK]   0140   Liver functions are also mildly elevated. [MK]   0144 Lactic acid is 12.8. [MK]   0147 Flu a is positive. [MK]   0212 Chest x-ray demonstrates evidence of multifocal pneumonia, worse in the right lung base. [MK]   0221 Beta  hydroxybutyrate mildly elevated.  I do feel this is likely consistent with starvation ketosis given the patient's significant illness. [MK]      ED Course User Index  [MK] Claus Torres MD         Diagnoses as of 01/09/24 0241   Influenza A   Atrial fibrillation with RVR (CMS/HCC)   Pneumonia of right lower lobe due to infectious organism   Acute respiratory failure with hypoxia (CMS/HCC)   Acute kidney injury (CMS/HCC)       Medical Decision Making  Medical Decision Making: Patient presents to the emergency department shortness of breath.  On arrival, he is markedly tachycardic with rates in the 180s to 190s.  EKG was obtained.  It does appear to be atrial fibrillation with rapid ventricular response.  Patient was given aliquots of diltiazem with some improvement of his rate, but maintained RVR.  He was requiring significant supplemental oxygen.  He was transitioned to Airvo with improvement of oxygen saturations.  Chest x-ray was obtained which does show dense right lower lobe pneumonia.  Metabolic workup pursued.  Patient has mild leukopenia.  He has evidence of acute kidney injury and persistent hypoglycemia.  Patient started on a D10 infusion.  After discussion, he states he has not really been eating or drinking for at least over a week.  Patient is also found to be influenza positive.  I do feel that his lactic acidosis is likely multifactorial from hypoxemia, anaerobic metabolism, and infectious process.  Patient was covered with broad-spectrum antibiotics.  He was given fluids.  I did discuss his case with the intensive care unit.  At this point, patient will be admitted.    EKG interpreted by myself (ED attending physician): [Atrial fibrillation with rapid ventricular response    Differential Diagnoses Considered: Pneumonia, dehydration, acidosis, malignancy, new onset dysrhythmia    Chronic Medical Conditions Significantly Affecting Care: History of hepatitis C    External Records Reviewed: I reviewed  recent and relevant outside records including: No recent outpatient visits    Independent Interpretation of Studies:  I independently interpreted: Chest x-ray obtained reviewed    Escalation of Care:  Appropriate for acute hospitalization    Social Determinants of Health Significantly Affecting Care:  Does not follow with primary care    Prescription Drug Consideration: IV fluids, diltiazem, D50, IV antibiotics    Diagnostic testing considered: Noncontributory    Discussion of Management with Other Providers:   I discussed the patient/results with: Intensive care          Procedure  Procedures     Claus Torres MD  01/09/24 2471

## 2024-01-09 NOTE — PROGRESS NOTES
"Vancomycin Dosing by Pharmacy- INITIAL    Aman Leija is a 68 y.o. year old male who Pharmacy has been consulted for vancomycin dosing for pneumonia. Based on the patient's indication and renal status this patient will be dosed based on a goal trough/random level of 15-20.     Renal function is currently declining.  ZAMZAM    Visit Vitals  /68   Pulse (!) 145   Temp 36.2 °C (97.2 °F) (Temporal)   Resp (!) 51        Lab Results   Component Value Date    CREATININE 2.06 (H) 01/09/2024        Patient weight is No results found for: \"PTWEIGHT\"    No results found for: \"CULTURE\"     No intake/output data recorded.  [unfilled]    Lab Results   Component Value Date    PATIENTTEMP 37.0 01/09/2024          Assessment/Plan     Patient has already been given a loading dose of 1250 mg on 1/9 ~0230  Follow-up level will be ordered on 1/10 at 0000 unless clinically indicated sooner.  Will continue to monitor renal function daily while on vancomycin and order serum creatinine at least every 48 hours if not already ordered.  Follow for continued vancomycin needs, clinical response, and signs/symptoms of toxicity.       Zane Cronin [facility]       "

## 2024-01-09 NOTE — ED PROCEDURE NOTE
Procedure  Critical Care    Performed by: Claus Torres MD  Authorized by: Claus Torres MD    Critical care provider statement:     Critical care time (minutes):  40    Critical care time was exclusive of:  Separately billable procedures and treating other patients and teaching time    Critical care was necessary to treat or prevent imminent or life-threatening deterioration of the following conditions:  Sepsis and shock    Critical care was time spent personally by me on the following activities:  Ordering and performing treatments and interventions, ordering and review of laboratory studies, ordering and review of radiographic studies, development of treatment plan with patient or surrogate, pulse oximetry, evaluation of patient's response to treatment, re-evaluation of patient's condition, discussions with primary provider, review of old charts, examination of patient, obtaining history from patient or surrogate and discussions with consultants    Care discussed with: admitting provider                 Claus Torres MD  01/09/24 0159

## 2024-01-09 NOTE — CARE PLAN
Problem: Pain  Goal: My pain/discomfort is manageable  Outcome: Progressing     Problem: Safety  Goal: Patient will be injury free during hospitalization  Outcome: Progressing  Goal: I will remain free of falls  Outcome: Progressing     Problem: Daily Care  Goal: Daily care needs are met  Outcome: Progressing     Problem: Psychosocial Needs  Goal: Demonstrates ability to cope with hospitalization/illness  Outcome: Progressing  Goal: Collaborate with me, my family, and caregiver to identify my specific goals  Outcome: Progressing     Problem: Discharge Barriers  Goal: My discharge needs are met  Outcome: Progressing     Problem: Safety - Medical Restraint  Goal: Remains free of injury from restraints (Restraint for Interference with Medical Device)  Outcome: Progressing  Goal: Free from restraint(s) (Restraint for Interference with Medical Device)  Outcome: Progressing   The patient's goals for the shift include      The clinical goals for the shift include      Over the shift, the patient did not make progress toward the following goals. Barriers to progression include patients acuteness of illness. Recommendations to address these barriers include continue to reinforce and include family in plan of care.

## 2024-01-09 NOTE — CONSULTS
"Vancomycin Dosing by Pharmacy- INITIAL    Vancomycin Dosing by Pharmacy- EMERGENCY DEPARTMENT    Aman Leija is a 68 y.o. year old male who Pharmacy has been consulted to give a ONE TIME ONLY vancomycin dose in the Emergency Department for pneumonia.     Visit Vitals  /79   Pulse (!) 149   Temp 36.2 °C (97.2 °F) (Temporal)   Resp (!) 36        No results found for: \"CREATININE\"     Patient weight is   Wt Readings from Last 1 Encounters:   01/09/24 54.4 kg (120 lb)        Assessment/Plan     Patient will be given a one time loading dose of 1250 mg  Pharmacy will sign off at this time. If vancomycin is to be continued, please re-consult Pharmacy.       Zane Cronin RPh    "

## 2024-01-09 NOTE — CONSULTS
"Nutrition Note  Reason for Assessment  Reason for Assessment: Provider consult order    Visit Reason: SOB  weakness  Recommendation(s):  A regular diet was ordered early this morning but pt has not been able to eat. Will follow clinical course for pt stabilizing and any nutrition intervention needs        Nutrition Assessment    Nutrition History  Food and Nutrient History: Pt has been having difficulty breathing so eating is difficult.        Per Flowsheet Percent Meal intake: 0  Dietary Orders (From admission, onward)       Start     Ordered    01/09/24 0521  Adult diet Regular  Diet effective now        Question:  Diet type  Answer:  Regular    01/09/24 0521                  Other (Comment) (NPO based on oxygen requirements at this time)  Results from last 7 days   Lab Units 01/09/24  0055   GLUCOSE mg/dL 43*   SODIUM mmol/L 137   POTASSIUM mmol/L 4.3   CHLORIDE mmol/L 94*   CO2 mmol/L 19*   BUN mg/dL 64*   CREATININE mg/dL 2.06*   EGFR mL/min/1.73m*2 34*   CALCIUM mg/dL 9.1   PHOSPHORUS mg/dL 6.9*   MAGNESIUM mg/dL 3.72*     No results found for: \"HGBA1C\"  Results from last 7 days   Lab Units 01/09/24  0529 01/09/24  0210 01/09/24  0119   POCT GLUCOSE mg/dL 114* 128* 36*     GI per flowsheet:  Gastrointestinal  Gastrointestinal (WDL): Exceptions to WDL  Abdomen Inspection: Flat (patient iappears extremely mal nourished)  Abdominal Tenderness: Nontender  Bowel Sounds: All quadrants  Bowel Sounds (All Quadrants): Active  Last bowel movement documented:    PMH: smoker; pneumonia; Flu +; dehydration; acidosis; polysubstance abuse; hep C  Allergies: Patient has no known allergies.     Anthropometrics:  Height: 182.9 cm (6' 0.01\")  Weight: 54.4 kg (119 lb 14.9 oz)  BMI (Calculated): 16.26  IBW: 81 kg  %IBW: 67 %      Weight History / % Weight Change: Records indicate pt was 160 lbs in Oct 2021, 134 lbs in sept of 2022 and 120 lbs this admit.  He was down 26 lbs the first year, 16%,  14 lbs the second year  10% and a " total of 26% of his weight in a little over 2 years.  He is 67% of his IBW this admit with a BMI of 16.3             Estimated Nutritional Needs:  Method for Estimating Needs: 9308-9816   MSJ    Method for Estimating Needs: 81-97  1-1.2 gm kg of IBW    Method for Estimating Needs: 5413-5130  as medically indicated   20-30 ml kg of IBW    Nutrition Focused Physical Findings:   Orbital Fat Pads: Severe (dark circles, hollowing and loose skin)  Buccal Fat Pads: Severe (hollow, sunken and narrow face)  Triceps: Severe (negligible fat tissue)  Ribs: Severe (protruding prominent clavicle)    Temporalis: Severe (hollowed scooping depression)  Pectoralis (Clavicular Region): Severe (protruding prominent clavicle)  Deltoid/Trapezius: Severe (squared shoulders, acromion process prominent)  Interosseous: Severe (depressed area between thumb and forefinger)  Quadriceps: Severe (depressions on inner and outer thigh)  Gastrocnemius: Severe (minimal muscle definition)        Pain Score: 0 - No pain  Critical-Care Pain Observation Score:  [0]      Nutrition Diagnosis   Patient has Malnutrition Diagnosis: Yes  Diagnosis Status: New  Malnutrition Diagnosis: Severe malnutrition related to starvation  As Evidenced by: < 50% energy intake compared to estimated needs for > 1 month.  Severe muscle and fat loss noted on physical exam.  Pt is 67% of his IBW with a BMI of 16.3.  He is down 26% of his weight over the last 27 months.    Patient has Nutrition Diagnosis: Yes  Ongoing  Nutrition Diagnosis 1: Inadequate oral intake  Related to (1): decreased ability to consume sufficient energy  As Evidenced by (1): pt is having trouble eating due to difficulty breathing       Nutrition Interventions/Recommendations   Interventions        Individualized Nutrition Prescription Provided for : A regular diet was ordered early this morning but pt has not been able to eat.  Will follow clinical course for pt stabilizing and any nutrition intervention  needs    Nutrition Monitoring and Evaluation   Biochemical Data, Medical Tests and Procedures  Monitoring and Evaluation Plan: Electrolyte/renal panel, Glucose/endocrine profile    Progress towards goals: Not Met    Education Documentation  No documentation found.        Time Spent (min): 45 minutes  Last Date of Nutrition Visit: 01/09/24  Nutrition Follow-Up Needed?: 3-5 days  Follow up Comment: 1/11   MZ

## 2024-01-10 LAB
ALBUMIN SERPL BCP-MCNC: 2.1 G/DL (ref 3.4–5)
ALP SERPL-CCNC: 38 U/L (ref 33–136)
ALT SERPL W P-5'-P-CCNC: 42 U/L (ref 10–52)
ANION GAP SERPL CALC-SCNC: 15 MMOL/L (ref 10–20)
AST SERPL W P-5'-P-CCNC: 116 U/L (ref 9–39)
BASOPHILS # BLD MANUAL: 0 X10*3/UL (ref 0–0.1)
BASOPHILS NFR BLD MANUAL: 0 %
BILIRUB DIRECT SERPL-MCNC: 1.4 MG/DL (ref 0–0.3)
BILIRUB SERPL-MCNC: 2.4 MG/DL (ref 0–1.2)
BUN SERPL-MCNC: 91 MG/DL (ref 6–23)
BURR CELLS BLD QL SMEAR: ABNORMAL
CALCIUM SERPL-MCNC: 7 MG/DL (ref 8.6–10.3)
CHLORIDE SERPL-SCNC: 98 MMOL/L (ref 98–107)
CO2 SERPL-SCNC: 27 MMOL/L (ref 21–32)
CREAT SERPL-MCNC: 1.94 MG/DL (ref 0.5–1.3)
CRP SERPL-MCNC: 37.26 MG/DL
DOHLE BOD BLD QL SMEAR: PRESENT
EGFRCR SERPLBLD CKD-EPI 2021: 37 ML/MIN/1.73M*2
EOSINOPHIL # BLD MANUAL: 0 X10*3/UL (ref 0–0.7)
EOSINOPHIL NFR BLD MANUAL: 0 %
ERYTHROCYTE [DISTWIDTH] IN BLOOD BY AUTOMATED COUNT: 12.7 % (ref 11.5–14.5)
GIANT PLATELETS BLD QL SMEAR: ABNORMAL
GLUCOSE BLD MANUAL STRIP-MCNC: 106 MG/DL (ref 74–99)
GLUCOSE BLD MANUAL STRIP-MCNC: 82 MG/DL (ref 74–99)
GLUCOSE BLD MANUAL STRIP-MCNC: 85 MG/DL (ref 74–99)
GLUCOSE BLD MANUAL STRIP-MCNC: 96 MG/DL (ref 74–99)
GLUCOSE SERPL-MCNC: 90 MG/DL (ref 74–99)
HCT VFR BLD AUTO: 37.7 % (ref 41–52)
HGB BLD-MCNC: 12.9 G/DL (ref 13.5–17.5)
HOLD SPECIMEN: NORMAL
IMM GRANULOCYTES # BLD AUTO: 0.43 X10*3/UL (ref 0–0.7)
IMM GRANULOCYTES NFR BLD AUTO: 5 % (ref 0–0.9)
LEGIONELLA AG UR QL: NEGATIVE
LYMPHOCYTES # BLD MANUAL: 0.17 X10*3/UL (ref 1.2–4.8)
LYMPHOCYTES NFR BLD MANUAL: 2 %
MAGNESIUM SERPL-MCNC: 2.89 MG/DL (ref 1.6–2.4)
MCH RBC QN AUTO: 30.9 PG (ref 26–34)
MCHC RBC AUTO-ENTMCNC: 34.2 G/DL (ref 32–36)
MCV RBC AUTO: 90 FL (ref 80–100)
METAMYELOCYTES # BLD MANUAL: 0.61 X10*3/UL
METAMYELOCYTES NFR BLD MANUAL: 7 %
MONOCYTES # BLD MANUAL: 0.17 X10*3/UL (ref 0.1–1)
MONOCYTES NFR BLD MANUAL: 2 %
MYELOCYTES # BLD MANUAL: 0.17 X10*3/UL
MYELOCYTES NFR BLD MANUAL: 2 %
NEUTROPHILS # BLD MANUAL: 7.49 X10*3/UL (ref 1.2–7.7)
NEUTS BAND # BLD MANUAL: 2.18 X10*3/UL (ref 0–0.7)
NEUTS BAND NFR BLD MANUAL: 25 %
NEUTS SEG # BLD MANUAL: 5.31 X10*3/UL (ref 1.2–7)
NEUTS SEG NFR BLD MANUAL: 61 %
NEUTS VAC BLD QL SMEAR: PRESENT
NRBC BLD-RTO: 0.7 /100 WBCS (ref 0–0)
PHOSPHATE SERPL-MCNC: 4.7 MG/DL (ref 2.5–4.9)
PLATELET # BLD AUTO: 80 X10*3/UL (ref 150–450)
POTASSIUM SERPL-SCNC: 4.8 MMOL/L (ref 3.5–5.3)
PROT SERPL-MCNC: 4.9 G/DL (ref 6.4–8.2)
Q ONSET: 213 MS
QRS COUNT: 29 BEATS
QRS DURATION: 90 MS
QT INTERVAL: 272 MS
QTC CALCULATION(BAZETT): 465 MS
QTC FREDERICIA: 389 MS
R AXIS: 83 DEGREES
RBC # BLD AUTO: 4.18 X10*6/UL (ref 4.5–5.9)
RBC MORPH BLD: ABNORMAL
S PNEUM AG UR QL: POSITIVE
SODIUM SERPL-SCNC: 135 MMOL/L (ref 136–145)
STAPHYLOCOCCUS SPEC CULT: ABNORMAL
T AXIS: 80 DEGREES
T OFFSET: 349 MS
TOTAL CELLS COUNTED BLD: 100
TOXIC GRANULES BLD QL SMEAR: PRESENT
VARIANT LYMPHS # BLD MANUAL: 0.09 X10*3/UL (ref 0–0.5)
VARIANT LYMPHS NFR BLD: 1 %
VENTRICULAR RATE: 176 BPM
WBC # BLD AUTO: 8.7 X10*3/UL (ref 4.4–11.3)

## 2024-01-10 PROCEDURE — 2500000005 HC RX 250 GENERAL PHARMACY W/O HCPCS: Performed by: STUDENT IN AN ORGANIZED HEALTH CARE EDUCATION/TRAINING PROGRAM

## 2024-01-10 PROCEDURE — 85027 COMPLETE CBC AUTOMATED: CPT | Performed by: STUDENT IN AN ORGANIZED HEALTH CARE EDUCATION/TRAINING PROGRAM

## 2024-01-10 PROCEDURE — 2500000004 HC RX 250 GENERAL PHARMACY W/ HCPCS (ALT 636 FOR OP/ED): Performed by: INTERNAL MEDICINE

## 2024-01-10 PROCEDURE — 85007 BL SMEAR W/DIFF WBC COUNT: CPT | Performed by: STUDENT IN AN ORGANIZED HEALTH CARE EDUCATION/TRAINING PROGRAM

## 2024-01-10 PROCEDURE — 2020000001 HC ICU ROOM DAILY

## 2024-01-10 PROCEDURE — 82947 ASSAY GLUCOSE BLOOD QUANT: CPT

## 2024-01-10 PROCEDURE — 2500000002 HC RX 250 W HCPCS SELF ADMINISTERED DRUGS (ALT 637 FOR MEDICARE OP, ALT 636 FOR OP/ED): Performed by: STUDENT IN AN ORGANIZED HEALTH CARE EDUCATION/TRAINING PROGRAM

## 2024-01-10 PROCEDURE — 37799 UNLISTED PX VASCULAR SURGERY: CPT | Performed by: STUDENT IN AN ORGANIZED HEALTH CARE EDUCATION/TRAINING PROGRAM

## 2024-01-10 PROCEDURE — 84100 ASSAY OF PHOSPHORUS: CPT | Performed by: STUDENT IN AN ORGANIZED HEALTH CARE EDUCATION/TRAINING PROGRAM

## 2024-01-10 PROCEDURE — 84075 ASSAY ALKALINE PHOSPHATASE: CPT | Performed by: STUDENT IN AN ORGANIZED HEALTH CARE EDUCATION/TRAINING PROGRAM

## 2024-01-10 PROCEDURE — 86140 C-REACTIVE PROTEIN: CPT | Performed by: STUDENT IN AN ORGANIZED HEALTH CARE EDUCATION/TRAINING PROGRAM

## 2024-01-10 PROCEDURE — 2500000004 HC RX 250 GENERAL PHARMACY W/ HCPCS (ALT 636 FOR OP/ED): Performed by: STUDENT IN AN ORGANIZED HEALTH CARE EDUCATION/TRAINING PROGRAM

## 2024-01-10 PROCEDURE — S4991 NICOTINE PATCH NONLEGEND: HCPCS | Performed by: STUDENT IN AN ORGANIZED HEALTH CARE EDUCATION/TRAINING PROGRAM

## 2024-01-10 PROCEDURE — 94660 CPAP INITIATION&MGMT: CPT

## 2024-01-10 PROCEDURE — 2500000001 HC RX 250 WO HCPCS SELF ADMINISTERED DRUGS (ALT 637 FOR MEDICARE OP): Performed by: STUDENT IN AN ORGANIZED HEALTH CARE EDUCATION/TRAINING PROGRAM

## 2024-01-10 PROCEDURE — 02HV33Z INSERTION OF INFUSION DEVICE INTO SUPERIOR VENA CAVA, PERCUTANEOUS APPROACH: ICD-10-PCS | Performed by: EMERGENCY MEDICINE

## 2024-01-10 PROCEDURE — 83735 ASSAY OF MAGNESIUM: CPT | Performed by: STUDENT IN AN ORGANIZED HEALTH CARE EDUCATION/TRAINING PROGRAM

## 2024-01-10 RX ORDER — PHENOBARBITAL 32.4 MG/1
32.4 TABLET ORAL 2 TIMES DAILY
Status: DISCONTINUED | OUTPATIENT
Start: 2024-01-10 | End: 2024-01-10

## 2024-01-10 RX ORDER — METOPROLOL TARTRATE 1 MG/ML
5 INJECTION, SOLUTION INTRAVENOUS ONCE
Status: COMPLETED | OUTPATIENT
Start: 2024-01-10 | End: 2024-01-10

## 2024-01-10 RX ORDER — PHENOBARBITAL 20 MG/5ML
30 ELIXIR ORAL 2 TIMES DAILY
Status: DISCONTINUED | OUTPATIENT
Start: 2024-01-10 | End: 2024-01-10

## 2024-01-10 RX ORDER — CEFTRIAXONE 2 G/50ML
2 INJECTION, SOLUTION INTRAVENOUS EVERY 24 HOURS
Status: DISCONTINUED | OUTPATIENT
Start: 2024-01-11 | End: 2024-01-15

## 2024-01-10 RX ORDER — PHENOBARBITAL 32.4 MG/1
64.8 TABLET ORAL 2 TIMES DAILY
Status: DISPENSED | OUTPATIENT
Start: 2024-01-10 | End: 2024-01-12

## 2024-01-10 RX ORDER — PHENOBARBITAL SODIUM 65 MG/ML
32.5 INJECTION, SOLUTION INTRAMUSCULAR; INTRAVENOUS EVERY 2 HOUR PRN
Status: DISCONTINUED | OUTPATIENT
Start: 2024-01-10 | End: 2024-01-15 | Stop reason: HOSPADM

## 2024-01-10 RX ORDER — DIGOXIN 0.25 MG/ML
500 INJECTION INTRAMUSCULAR; INTRAVENOUS ONCE
Status: COMPLETED | OUTPATIENT
Start: 2024-01-10 | End: 2024-01-10

## 2024-01-10 RX ORDER — METOPROLOL TARTRATE 25 MG/1
25 TABLET, FILM COATED ORAL 2 TIMES DAILY
Status: DISCONTINUED | OUTPATIENT
Start: 2024-01-10 | End: 2024-01-15

## 2024-01-10 RX ADMIN — HEPARIN SODIUM 5000 UNITS: 5000 INJECTION INTRAVENOUS; SUBCUTANEOUS at 13:57

## 2024-01-10 RX ADMIN — METOPROLOL TARTRATE 25 MG: 25 TABLET, FILM COATED ORAL at 10:26

## 2024-01-10 RX ADMIN — THIAMINE HYDROCHLORIDE 500 MG: 100 INJECTION, SOLUTION INTRAMUSCULAR; INTRAVENOUS at 08:56

## 2024-01-10 RX ADMIN — HEPARIN SODIUM 5000 UNITS: 5000 INJECTION INTRAVENOUS; SUBCUTANEOUS at 04:43

## 2024-01-10 RX ADMIN — OSELTAMAVIR PHOSPHATE 30 MG: 30 CAPSULE ORAL at 09:04

## 2024-01-10 RX ADMIN — PHENOBARBITAL SODIUM 32.5 MG: 65 INJECTION INTRAMUSCULAR at 16:13

## 2024-01-10 RX ADMIN — HYDROCORTISONE SODIUM SUCCINATE 100 MG: 100 INJECTION, POWDER, FOR SOLUTION INTRAMUSCULAR; INTRAVENOUS at 04:43

## 2024-01-10 RX ADMIN — AZITHROMYCIN MONOHYDRATE 500 MG: 500 INJECTION, POWDER, LYOPHILIZED, FOR SOLUTION INTRAVENOUS at 10:27

## 2024-01-10 RX ADMIN — PHENOBARBITAL SODIUM 32.5 MG: 65 INJECTION INTRAMUSCULAR at 13:55

## 2024-01-10 RX ADMIN — METOPROLOL TARTRATE 5 MG: 5 INJECTION INTRAVENOUS at 20:35

## 2024-01-10 RX ADMIN — BUPRENORPHINE 8 MG: 8 TABLET SUBLINGUAL at 22:24

## 2024-01-10 RX ADMIN — PHENOBARBITAL SODIUM 32.5 MG: 65 INJECTION INTRAMUSCULAR at 08:51

## 2024-01-10 RX ADMIN — CEFTRIAXONE SODIUM 1 G: 1 INJECTION, SOLUTION INTRAVENOUS at 09:25

## 2024-01-10 RX ADMIN — BUPRENORPHINE 8 MG: 8 TABLET SUBLINGUAL at 08:45

## 2024-01-10 RX ADMIN — AMIODARONE HYDROCHLORIDE 1 MG/MIN: 1.8 INJECTION, SOLUTION INTRAVENOUS at 20:34

## 2024-01-10 RX ADMIN — Medication 1 PATCH: at 08:46

## 2024-01-10 RX ADMIN — LIDOCAINE 1 PATCH: 4 PATCH TOPICAL at 10:28

## 2024-01-10 RX ADMIN — DIGOXIN 500 MCG: 0.25 INJECTION INTRAMUSCULAR; INTRAVENOUS at 10:26

## 2024-01-10 RX ADMIN — HEPARIN SODIUM 5000 UNITS: 5000 INJECTION INTRAVENOUS; SUBCUTANEOUS at 22:24

## 2024-01-10 ASSESSMENT — COGNITIVE AND FUNCTIONAL STATUS - GENERAL
MOBILITY SCORE: 14
CLIMB 3 TO 5 STEPS WITH RAILING: TOTAL
DAILY ACTIVITIY SCORE: 14
DRESSING REGULAR LOWER BODY CLOTHING: A LOT
DRESSING REGULAR UPPER BODY CLOTHING: A LOT
TOILETING: A LOT
PERSONAL GROOMING: A LITTLE
MOVING TO AND FROM BED TO CHAIR: A LOT
WALKING IN HOSPITAL ROOM: A LOT
TURNING FROM BACK TO SIDE WHILE IN FLAT BAD: A LITTLE
HELP NEEDED FOR BATHING: A LOT
STANDING UP FROM CHAIR USING ARMS: A LOT
EATING MEALS: A LITTLE

## 2024-01-10 ASSESSMENT — PAIN SCALES - GENERAL
PAINLEVEL_OUTOF10: 0 - NO PAIN
PAINLEVEL_OUTOF10: 0 - NO PAIN

## 2024-01-10 ASSESSMENT — PAIN - FUNCTIONAL ASSESSMENT: PAIN_FUNCTIONAL_ASSESSMENT: CPOT (CRITICAL CARE PAIN OBSERVATION TOOL)

## 2024-01-10 NOTE — PROGRESS NOTES
Subjective  A complete 10 point review of systems was completed and is otherwise negative unless stated.       Physical exam  Physical Exam:   General appearance: Altered, cachectic, poorly redirectable, fidgety and anxious  HEENT: Edentulous  Neck: no obvious deformity, JVP WNL  Respiratory: Use of accessory muscles including scalenes intercostals, appropriate respiratory effort  Cardiovascular: Irregular tachycardia  Abdomen: no organomegaly, no tenderness to palpation in all quadrants  Extremities: Frail musculature  Skin: intact, no rashes   Neurologic: Tangential thoughts, fidgety and anxious, mildly tremulous, redirectable however at times uncooperative  Psych: Difficult to assess     Remainder of objective data including imaging and laboratory findings were all reviewed.     Assessment and plan:  Brief admission history:  68-year-old with history of polysubstance abuse, hepatitis C.  Presented to hospital short of breath and feeling lethargic.  Elevated liver enzymes, low glucose, possible ZAMZAM.  Also significant pneumonia with possible parapneumonic effusion.  In atrial fibrillation with rapid ventricular rate, on oxygen.  Admitted to ICU in setting of multiorgan dysfunction.    Daily progress:  1/10: Patient did well overnight, blood pressure stable, heart rate does occasionally jump from sinus rhythm to atrial fibrillation with rapid ventricular rate, this is likely in the setting of agitation given that when he is calm he is in sinus rhythm, add metoprolol twice daily to his regimen and attempt to control his rates bit better, 1 push of digoxin this morning as he did respond nicely to yesterday, phenobarbital p.o. and IV as needed for withdrawal symptoms, blood cultures growing gram-positive cocci in pairs and chains suspicious for Streptococcus, requiring a bit more oxygen today and suspect that he is developing a parapneumonic effusion, will investigate with ultrasound later today     Neurologic:  #  Acute toxic encephalopathy  # Substance abuse  # EtOH withdrawal, suspect  Continue buprenorphine  EtOH withdrawal, phenobarbital protocol    Cardiovascular:  # Atrial fibrillation with rapid ventricular rate, paroxysmal  In the setting of numerous metabolic abnormalities  Would warrant anticoagulation however this is not a pressing issue given his other issues at this time  Metoprolol 25 twice daily for rate control, digoxin pushes as needed  Correct electrolyte abnormalities and agitation     Respiratory:  # Acute hypoxic respiratory failure  # Tobacco abuse  # Multifocal pneumonia with possible parapneumonic effusion  # Influenza A  Respiratory support with high flow oxygen alternating with nonrebreather  Will examine pleural effusion when patient is more stable  For now, ceftriaxone, azithromycin     Gastrointestinal:  # Hepatitis C  # Alcohol hepatitis, possible  Hold off on steroid for now     Renal:  # Possible ZAMZAM  # HAGMA  # Lactic acidosis  # Prerenal azotemia  Hemodynamic ZAMZAM, creatinine stable, BUN elevated  Suspect prerenal etiology, low suspicion for GI bleed  Hold steroids as uremic encephalopathy will be a difficult condition to correct in this patient     Hematologic/oncologic:  # Leukopenia  Insetting of infection     Infectious disease:  # Community-acquired pneumonia with likely parapneumonic effusion  # Bloodstream infection, gram-positive cocci pairs and chains  Streptococcus versus Enterococcus, suspect the former  Will investigate possible parapneumonic effusion  Ceftriaxone, azithromycin continued  Narrow as indicated    Endocrine:  No issues     Tubes:-Airvo  Lines: Peripheral IVs, right IJ  Devices: None  Fluids: As needed  Antibiotics: Ceftriaxone, azithromycin  Prophylaxis: Heparin  CODE STATUS: Full     This is a prelim note, please await attending attestation.     Seth Echevarria, DO     This note was entered with the assistance of dictation software, please excuse any grammatical errors  or brevity.

## 2024-01-10 NOTE — PROCEDURES
Central Line    Date/Time: 1/10/2024 1:53 PM    Performed by: Seth Echevarria DO  Authorized by: Gerardo Slade MD    Consent:     Consent obtained:  Emergent situation    Consent given by:  Patient    Risks, benefits, and alternatives were discussed: yes    Universal protocol:     Patient identity confirmed:  Provided demographic data  Pre-procedure details:     Indication(s): central venous access      Skin preparation:  Chlorhexidine  Sedation:     Sedation type:  None  Anesthesia:     Anesthesia method:  Local infiltration  Procedure details:     Location:  R internal jugular    Patient position:  Supine    Procedural supplies:  Triple lumen    Ultrasound guidance: yes      Ultrasound guidance timing: real time      Number of attempts:  1    Successful placement: yes    Post-procedure details:     Post-procedure:  Dressing applied and line sutured    Assessment:  Blood return through all ports, no pneumothorax on x-ray, placement verified by x-ray and free fluid flow    Procedure completion:  Tolerated

## 2024-01-10 NOTE — PROGRESS NOTES
"Pt admitted for SOB and found to be Flu A positive. Currently on ICU and on Airvo 60%, 50L. Per MD, pt admitted to polysubstance abuse of \"street drugs\" but he is unsure of what exactly he has taken. Urine tox ordered and still pending collection. SW updated.  "

## 2024-01-10 NOTE — SIGNIFICANT EVENT
01/09/24 0815   Pre-Procedure Checklist   Emergent Line Insertion No   Type of Line to be Placed CVC   Consent Obtained Yes   Patient Identified with 2 Independent Identifiers Yes   Review of Allergies, Anticoagulation, Relevant Labs, ECG/Telemetry Yes   Risks/Benefits/Alternatives Discussed with Patient/POA/Legal Representative Yes   Stop Sign on Door Yes   Time Out Performed Yes   Catheter Exchange Yes   Positioning and Preparation Checklist   All People, Including Patient, in the Room with Cap and Mask Yes   Fluoroscopy Used to Identify Vessel and Guide Insertion; Sterile Cover Used Yes   Full Barrier Precautions Followed (Mask, Cap, Gown, Gloves) Yes   Monitors Attached with Sound Alarms On Yes   Full Body Sterile Drape (Head-to-Toe) Used to Cover Patient Yes   [place] Position (For IJ and Subclavian) Yes   CHG Skin Prep Used and Allowed to Air Dry Prior to Skin Procedure Yes   Procedure Checklist   Blood Aspirated From All Lumens, All Ports Subsequently Flushed Yes   Catheter Caps Placed On All Lumens; Lumens Clamped Yes   Maintain Guidewire Control Throughout, Ensuring Guidewire Removal Yes   Maintain Sterile Field Throughout Insertion Yes   Catheter Secured Yes   Post-Procedure Checklist   Date and Time Written on Dressing Yes   Sharp and Wire Count and Safe Disposal of all Sharps/Wires Yes   Sterile Dressing Applied Per Protocol Yes   X-ray Ordered or ECG Image Yes

## 2024-01-11 ENCOUNTER — APPOINTMENT (OUTPATIENT)
Dept: RADIOLOGY | Facility: HOSPITAL | Age: 69
DRG: 871 | End: 2024-01-11
Payer: COMMERCIAL

## 2024-01-11 LAB
ALBUMIN SERPL BCP-MCNC: 2.2 G/DL (ref 3.4–5)
ALP SERPL-CCNC: 229 U/L (ref 33–136)
ALT SERPL W P-5'-P-CCNC: 55 U/L (ref 10–52)
ANION GAP BLDA CALCULATED.4IONS-SCNC: 8 MMO/L (ref 10–25)
ANION GAP SERPL CALC-SCNC: 15 MMOL/L (ref 10–20)
AST SERPL W P-5'-P-CCNC: 152 U/L (ref 9–39)
BASE EXCESS BLDA CALC-SCNC: 3.9 MMOL/L (ref -2–3)
BASOPHILS # BLD MANUAL: 0 X10*3/UL (ref 0–0.1)
BASOPHILS NFR BLD MANUAL: 0 %
BILIRUB DIRECT SERPL-MCNC: 1.4 MG/DL (ref 0–0.3)
BILIRUB SERPL-MCNC: 2.3 MG/DL (ref 0–1.2)
BODY TEMPERATURE: 37 DEGREES CELSIUS
BUN SERPL-MCNC: 108 MG/DL (ref 6–23)
CA-I BLDA-SCNC: 1.08 MMOL/L (ref 1.1–1.33)
CALCIUM SERPL-MCNC: 7.5 MG/DL (ref 8.6–10.3)
CHLORIDE BLDA-SCNC: 98 MMOL/L (ref 98–107)
CHLORIDE SERPL-SCNC: 98 MMOL/L (ref 98–107)
CO2 SERPL-SCNC: 26 MMOL/L (ref 21–32)
CREAT SERPL-MCNC: 1.56 MG/DL (ref 0.5–1.3)
CRP SERPL-MCNC: 23.25 MG/DL
EGFRCR SERPLBLD CKD-EPI 2021: 47 ML/MIN/1.73M*2
EOSINOPHIL # BLD MANUAL: 0 X10*3/UL (ref 0–0.7)
EOSINOPHIL NFR BLD MANUAL: 0 %
ERYTHROCYTE [DISTWIDTH] IN BLOOD BY AUTOMATED COUNT: 12.5 % (ref 11.5–14.5)
GLUCOSE BLDA-MCNC: 247 MG/DL (ref 74–99)
GLUCOSE SERPL-MCNC: 147 MG/DL (ref 74–99)
HCO3 BLDA-SCNC: 28.5 MMOL/L (ref 22–26)
HCT VFR BLD AUTO: 39.1 % (ref 41–52)
HCT VFR BLD EST: 40 % (ref 41–52)
HGB BLD-MCNC: 13.5 G/DL (ref 13.5–17.5)
HGB BLDA-MCNC: 13.4 G/DL (ref 13.5–17.5)
IMM GRANULOCYTES # BLD AUTO: 0.72 X10*3/UL (ref 0–0.7)
IMM GRANULOCYTES NFR BLD AUTO: 4.4 % (ref 0–0.9)
INHALED O2 CONCENTRATION: 70 %
LACTATE BLDA-SCNC: 2.9 MMOL/L (ref 0.4–2)
LYMPHOCYTES # BLD MANUAL: 1.32 X10*3/UL (ref 1.2–4.8)
LYMPHOCYTES NFR BLD MANUAL: 8 %
MAGNESIUM SERPL-MCNC: 3.13 MG/DL (ref 1.6–2.4)
MCH RBC QN AUTO: 30.9 PG (ref 26–34)
MCHC RBC AUTO-ENTMCNC: 34.5 G/DL (ref 32–36)
MCV RBC AUTO: 90 FL (ref 80–100)
MONOCYTES # BLD MANUAL: 0.5 X10*3/UL (ref 0.1–1)
MONOCYTES NFR BLD MANUAL: 3 %
NEUTROPHILS # BLD MANUAL: 14.69 X10*3/UL (ref 1.2–7.7)
NEUTS BAND # BLD MANUAL: 1.16 X10*3/UL (ref 0–0.7)
NEUTS BAND NFR BLD MANUAL: 7 %
NEUTS SEG # BLD MANUAL: 13.53 X10*3/UL (ref 1.2–7)
NEUTS SEG NFR BLD MANUAL: 82 %
NRBC BLD-RTO: 1.3 /100 WBCS (ref 0–0)
OXYHGB MFR BLDA: 94.7 % (ref 94–98)
PATH REVIEW-CBC DIFFERENTIAL: NORMAL
PCO2 BLDA: 42 MM HG (ref 38–42)
PH BLDA: 7.44 PH (ref 7.38–7.42)
PHOSPHATE SERPL-MCNC: 3.7 MG/DL (ref 2.5–4.9)
PLATELET # BLD AUTO: 46 X10*3/UL (ref 150–450)
PO2 BLDA: 74 MM HG (ref 85–95)
POTASSIUM BLDA-SCNC: 4.4 MMOL/L (ref 3.5–5.3)
POTASSIUM SERPL-SCNC: 4.5 MMOL/L (ref 3.5–5.3)
PROCALCITONIN SERPL-MCNC: 44.46 NG/ML
PROT SERPL-MCNC: 5.5 G/DL (ref 6.4–8.2)
RBC # BLD AUTO: 4.37 X10*6/UL (ref 4.5–5.9)
RBC MORPH BLD: ABNORMAL
SAO2 % BLDA: 97 % (ref 94–100)
SODIUM BLDA-SCNC: 130 MMOL/L (ref 136–145)
SODIUM SERPL-SCNC: 134 MMOL/L (ref 136–145)
TOTAL CELLS COUNTED BLD: 100
WBC # BLD AUTO: 16.5 X10*3/UL (ref 4.4–11.3)

## 2024-01-11 PROCEDURE — 2060000001 HC INTERMEDIATE ICU ROOM DAILY

## 2024-01-11 PROCEDURE — S4991 NICOTINE PATCH NONLEGEND: HCPCS | Performed by: STUDENT IN AN ORGANIZED HEALTH CARE EDUCATION/TRAINING PROGRAM

## 2024-01-11 PROCEDURE — 2500000004 HC RX 250 GENERAL PHARMACY W/ HCPCS (ALT 636 FOR OP/ED): Performed by: STUDENT IN AN ORGANIZED HEALTH CARE EDUCATION/TRAINING PROGRAM

## 2024-01-11 PROCEDURE — 84132 ASSAY OF SERUM POTASSIUM: CPT

## 2024-01-11 PROCEDURE — 2500000005 HC RX 250 GENERAL PHARMACY W/O HCPCS: Performed by: STUDENT IN AN ORGANIZED HEALTH CARE EDUCATION/TRAINING PROGRAM

## 2024-01-11 PROCEDURE — 84100 ASSAY OF PHOSPHORUS: CPT | Performed by: STUDENT IN AN ORGANIZED HEALTH CARE EDUCATION/TRAINING PROGRAM

## 2024-01-11 PROCEDURE — 80053 COMPREHEN METABOLIC PANEL: CPT | Performed by: STUDENT IN AN ORGANIZED HEALTH CARE EDUCATION/TRAINING PROGRAM

## 2024-01-11 PROCEDURE — 85060 BLOOD SMEAR INTERPRETATION: CPT | Performed by: STUDENT IN AN ORGANIZED HEALTH CARE EDUCATION/TRAINING PROGRAM

## 2024-01-11 PROCEDURE — 84075 ASSAY ALKALINE PHOSPHATASE: CPT | Performed by: STUDENT IN AN ORGANIZED HEALTH CARE EDUCATION/TRAINING PROGRAM

## 2024-01-11 PROCEDURE — 36600 WITHDRAWAL OF ARTERIAL BLOOD: CPT

## 2024-01-11 PROCEDURE — 86140 C-REACTIVE PROTEIN: CPT | Performed by: STUDENT IN AN ORGANIZED HEALTH CARE EDUCATION/TRAINING PROGRAM

## 2024-01-11 PROCEDURE — 94660 CPAP INITIATION&MGMT: CPT

## 2024-01-11 PROCEDURE — 2500000004 HC RX 250 GENERAL PHARMACY W/ HCPCS (ALT 636 FOR OP/ED): Performed by: INTERNAL MEDICINE

## 2024-01-11 PROCEDURE — 71045 X-RAY EXAM CHEST 1 VIEW: CPT

## 2024-01-11 PROCEDURE — 2500000001 HC RX 250 WO HCPCS SELF ADMINISTERED DRUGS (ALT 637 FOR MEDICARE OP): Performed by: STUDENT IN AN ORGANIZED HEALTH CARE EDUCATION/TRAINING PROGRAM

## 2024-01-11 PROCEDURE — 84145 PROCALCITONIN (PCT): CPT | Mod: PARLAB | Performed by: STUDENT IN AN ORGANIZED HEALTH CARE EDUCATION/TRAINING PROGRAM

## 2024-01-11 PROCEDURE — 85007 BL SMEAR W/DIFF WBC COUNT: CPT | Performed by: STUDENT IN AN ORGANIZED HEALTH CARE EDUCATION/TRAINING PROGRAM

## 2024-01-11 PROCEDURE — 37799 UNLISTED PX VASCULAR SURGERY: CPT | Performed by: STUDENT IN AN ORGANIZED HEALTH CARE EDUCATION/TRAINING PROGRAM

## 2024-01-11 PROCEDURE — 2500000002 HC RX 250 W HCPCS SELF ADMINISTERED DRUGS (ALT 637 FOR MEDICARE OP, ALT 636 FOR OP/ED): Performed by: STUDENT IN AN ORGANIZED HEALTH CARE EDUCATION/TRAINING PROGRAM

## 2024-01-11 PROCEDURE — 85027 COMPLETE CBC AUTOMATED: CPT | Performed by: STUDENT IN AN ORGANIZED HEALTH CARE EDUCATION/TRAINING PROGRAM

## 2024-01-11 PROCEDURE — 83735 ASSAY OF MAGNESIUM: CPT | Performed by: STUDENT IN AN ORGANIZED HEALTH CARE EDUCATION/TRAINING PROGRAM

## 2024-01-11 RX ORDER — SODIUM CHLORIDE, SODIUM LACTATE, POTASSIUM CHLORIDE, CALCIUM CHLORIDE 600; 310; 30; 20 MG/100ML; MG/100ML; MG/100ML; MG/100ML
250 INJECTION, SOLUTION INTRAVENOUS CONTINUOUS
Status: ACTIVE | OUTPATIENT
Start: 2024-01-11 | End: 2024-01-11

## 2024-01-11 RX ORDER — AMIODARONE HYDROCHLORIDE 200 MG/1
400 TABLET ORAL 2 TIMES DAILY
Status: DISCONTINUED | OUTPATIENT
Start: 2024-01-11 | End: 2024-01-15

## 2024-01-11 RX ORDER — OSELTAMIVIR PHOSPHATE 30 MG/1
30 CAPSULE ORAL 2 TIMES DAILY
Status: DISPENSED | OUTPATIENT
Start: 2024-01-11 | End: 2024-01-14

## 2024-01-11 RX ORDER — MUPIROCIN 20 MG/G
OINTMENT TOPICAL 2 TIMES DAILY
Status: CANCELLED | OUTPATIENT
Start: 2024-01-11 | End: 2024-01-16

## 2024-01-11 RX ADMIN — PHENOBARBITAL 64.8 MG: 32.4 TABLET ORAL at 09:32

## 2024-01-11 RX ADMIN — Medication 1 PATCH: at 09:32

## 2024-01-11 RX ADMIN — BUPRENORPHINE 8 MG: 8 TABLET SUBLINGUAL at 09:32

## 2024-01-11 RX ADMIN — AZITHROMYCIN MONOHYDRATE 500 MG: 500 INJECTION, POWDER, LYOPHILIZED, FOR SOLUTION INTRAVENOUS at 11:46

## 2024-01-11 RX ADMIN — PHENOBARBITAL SODIUM 32.5 MG: 65 INJECTION INTRAMUSCULAR at 03:11

## 2024-01-11 RX ADMIN — CEFTRIAXONE SODIUM 2 G: 2 INJECTION, SOLUTION INTRAVENOUS at 11:46

## 2024-01-11 RX ADMIN — AMIODARONE HYDROCHLORIDE 0.5 MG/MIN: 1.8 INJECTION, SOLUTION INTRAVENOUS at 03:00

## 2024-01-11 RX ADMIN — SODIUM CHLORIDE, POTASSIUM CHLORIDE, SODIUM LACTATE AND CALCIUM CHLORIDE 250 ML/HR: 600; 310; 30; 20 INJECTION, SOLUTION INTRAVENOUS at 07:33

## 2024-01-11 RX ADMIN — METOPROLOL TARTRATE 25 MG: 25 TABLET, FILM COATED ORAL at 09:32

## 2024-01-11 RX ADMIN — METOPROLOL TARTRATE 25 MG: 25 TABLET, FILM COATED ORAL at 20:51

## 2024-01-11 RX ADMIN — OSELTAMAVIR PHOSPHATE 30 MG: 30 CAPSULE ORAL at 09:32

## 2024-01-11 RX ADMIN — PHENOBARBITAL SODIUM 32.5 MG: 65 INJECTION INTRAMUSCULAR at 17:45

## 2024-01-11 RX ADMIN — HEPARIN SODIUM 5000 UNITS: 5000 INJECTION INTRAVENOUS; SUBCUTANEOUS at 06:18

## 2024-01-11 RX ADMIN — THIAMINE HYDROCHLORIDE 500 MG: 100 INJECTION, SOLUTION INTRAMUSCULAR; INTRAVENOUS at 11:45

## 2024-01-11 RX ADMIN — LIDOCAINE 1 PATCH: 4 PATCH TOPICAL at 09:32

## 2024-01-11 RX ADMIN — PHENOBARBITAL 64.8 MG: 32.4 TABLET ORAL at 20:50

## 2024-01-11 RX ADMIN — AMIODARONE HYDROCHLORIDE 400 MG: 200 TABLET ORAL at 20:50

## 2024-01-11 RX ADMIN — BUPRENORPHINE 8 MG: 8 TABLET SUBLINGUAL at 20:51

## 2024-01-11 RX ADMIN — OSELTAMAVIR PHOSPHATE 30 MG: 30 CAPSULE ORAL at 20:51

## 2024-01-11 SDOH — SOCIAL STABILITY: SOCIAL INSECURITY: DO YOU FEEL UNSAFE GOING BACK TO THE PLACE WHERE YOU ARE LIVING?: UNABLE TO ASSESS

## 2024-01-11 SDOH — SOCIAL STABILITY: SOCIAL INSECURITY: ABUSE: ADULT

## 2024-01-11 SDOH — SOCIAL STABILITY: SOCIAL INSECURITY: WERE YOU ABLE TO COMPLETE ALL THE BEHAVIORAL HEALTH SCREENINGS?: NO

## 2024-01-11 SDOH — SOCIAL STABILITY: SOCIAL INSECURITY: ARE THERE ANY APPARENT SIGNS OF INJURIES/BEHAVIORS THAT COULD BE RELATED TO ABUSE/NEGLECT?: UNABLE TO ASSESS

## 2024-01-11 SDOH — SOCIAL STABILITY: SOCIAL INSECURITY: DOES ANYONE TRY TO KEEP YOU FROM HAVING/CONTACTING OTHER FRIENDS OR DOING THINGS OUTSIDE YOUR HOME?: UNABLE TO ASSESS

## 2024-01-11 SDOH — SOCIAL STABILITY: SOCIAL INSECURITY: DO YOU FEEL ANYONE HAS EXPLOITED OR TAKEN ADVANTAGE OF YOU FINANCIALLY OR OF YOUR PERSONAL PROPERTY?: UNABLE TO ASSESS

## 2024-01-11 SDOH — HEALTH STABILITY: MENTAL HEALTH: HOW MANY STANDARD DRINKS CONTAINING ALCOHOL DO YOU HAVE ON A TYPICAL DAY?: PATIENT DECLINED

## 2024-01-11 SDOH — HEALTH STABILITY: MENTAL HEALTH: HOW OFTEN DO YOU HAVE 6 OR MORE DRINKS ON ONE OCCASION?: PATIENT DECLINED

## 2024-01-11 SDOH — SOCIAL STABILITY: SOCIAL INSECURITY: HAS ANYONE EVER THREATENED TO HURT YOUR FAMILY OR YOUR PETS?: UNABLE TO ASSESS

## 2024-01-11 SDOH — HEALTH STABILITY: MENTAL HEALTH: HOW OFTEN DO YOU HAVE A DRINK CONTAINING ALCOHOL?: PATIENT DECLINED

## 2024-01-11 SDOH — SOCIAL STABILITY: SOCIAL INSECURITY: ARE YOU OR HAVE YOU BEEN THREATENED OR ABUSED PHYSICALLY, EMOTIONALLY, OR SEXUALLY BY ANYONE?: UNABLE TO ASSESS

## 2024-01-11 SDOH — SOCIAL STABILITY: SOCIAL INSECURITY: HAVE YOU HAD THOUGHTS OF HARMING ANYONE ELSE?: NO

## 2024-01-11 SDOH — HEALTH STABILITY: PHYSICAL HEALTH: ON AVERAGE, HOW MANY MINUTES DO YOU ENGAGE IN EXERCISE AT THIS LEVEL?: PATIENT DECLINED

## 2024-01-11 SDOH — HEALTH STABILITY: PHYSICAL HEALTH
ON AVERAGE, HOW MANY DAYS PER WEEK DO YOU ENGAGE IN MODERATE TO STRENUOUS EXERCISE (LIKE A BRISK WALK)?: PATIENT DECLINED

## 2024-01-11 ASSESSMENT — PATIENT HEALTH QUESTIONNAIRE - PHQ9
2. FEELING DOWN, DEPRESSED OR HOPELESS: SEVERAL DAYS
SUM OF ALL RESPONSES TO PHQ9 QUESTIONS 1 & 2: 2
2. FEELING DOWN, DEPRESSED OR HOPELESS: SEVERAL DAYS
1. LITTLE INTEREST OR PLEASURE IN DOING THINGS: SEVERAL DAYS

## 2024-01-11 ASSESSMENT — ACTIVITIES OF DAILY LIVING (ADL)
GROOMING: UNABLE TO ASSESS
JUDGMENT_ADEQUATE_SAFELY_COMPLETE_DAILY_ACTIVITIES: UNABLE TO ASSESS
PATIENT'S MEMORY ADEQUATE TO SAFELY COMPLETE DAILY ACTIVITIES?: UNABLE TO ASSESS
ADEQUATE_TO_COMPLETE_ADL: UNABLE TO ASSESS
FEEDING YOURSELF: UNABLE TO ASSESS
HEARING - LEFT EAR: UNABLE TO ASSESS
TOILETING: UNABLE TO ASSESS
BATHING: UNABLE TO ASSESS
DRESSING YOURSELF: UNABLE TO ASSESS
HEARING - RIGHT EAR: UNABLE TO ASSESS
WALKS IN HOME: UNABLE TO ASSESS
LACK_OF_TRANSPORTATION: YES

## 2024-01-11 ASSESSMENT — COGNITIVE AND FUNCTIONAL STATUS - GENERAL
CLIMB 3 TO 5 STEPS WITH RAILING: TOTAL
PERSONAL GROOMING: TOTAL
TOILETING: TOTAL
DRESSING REGULAR LOWER BODY CLOTHING: TOTAL
DRESSING REGULAR UPPER BODY CLOTHING: TOTAL
CLIMB 3 TO 5 STEPS WITH RAILING: TOTAL
MOVING TO AND FROM BED TO CHAIR: TOTAL
HELP NEEDED FOR BATHING: TOTAL
WALKING IN HOSPITAL ROOM: TOTAL
DRESSING REGULAR UPPER BODY CLOTHING: TOTAL
WALKING IN HOSPITAL ROOM: TOTAL
DRESSING REGULAR LOWER BODY CLOTHING: TOTAL
STANDING UP FROM CHAIR USING ARMS: TOTAL
TOILETING: TOTAL
DAILY ACTIVITIY SCORE: 6
MOVING FROM LYING ON BACK TO SITTING ON SIDE OF FLAT BED WITH BEDRAILS: TOTAL
PERSONAL GROOMING: TOTAL
HELP NEEDED FOR BATHING: TOTAL
EATING MEALS: TOTAL
MOVING TO AND FROM BED TO CHAIR: TOTAL
MOVING FROM LYING ON BACK TO SITTING ON SIDE OF FLAT BED WITH BEDRAILS: TOTAL
STANDING UP FROM CHAIR USING ARMS: TOTAL
TURNING FROM BACK TO SIDE WHILE IN FLAT BAD: TOTAL
MOBILITY SCORE: 6
PATIENT BASELINE BEDBOUND: NO
MOBILITY SCORE: 6
TURNING FROM BACK TO SIDE WHILE IN FLAT BAD: TOTAL
DAILY ACTIVITIY SCORE: 6
EATING MEALS: TOTAL

## 2024-01-11 ASSESSMENT — LIFESTYLE VARIABLES
HOW OFTEN DO YOU HAVE A DRINK CONTAINING ALCOHOL: PATIENT UNABLE TO ANSWER
TOTAL SCORE: 14
SKIP TO QUESTIONS 9-10: 0
AUDIT-C TOTAL SCORE: -1
AGITATION: MODERATELY FIDGETY AND RESTLESS
ORIENTATION AND CLOUDING OF SENSORIUM: DISORIENTED FOR PLACE OR PERSON
AUDIT-C TOTAL SCORE: -1
HOW OFTEN DO YOU HAVE 6 OR MORE DRINKS ON ONE OCCASION: PATIENT DECLINED
AUDIT-C TOTAL SCORE: -1
ANXIETY: MODERATELY ANXIOUS, OR GUARDED, SO ANXIETY IS INFERRED
HOW MANY STANDARD DRINKS CONTAINING ALCOHOL DO YOU HAVE ON A TYPICAL DAY: PATIENT UNABLE TO ANSWER
HEADACHE, FULLNESS IN HEAD: NOT PRESENT
HOW OFTEN DO YOU HAVE 6 OR MORE DRINKS ON ONE OCCASION: PATIENT UNABLE TO ANSWER
AUDIT-C TOTAL SCORE: -1
NAUSEA AND VOMITING: NO NAUSEA AND NO VOMITING
HOW MANY STANDARD DRINKS CONTAINING ALCOHOL DO YOU HAVE ON A TYPICAL DAY: PATIENT DECLINED
VISUAL DISTURBANCES: NOT PRESENT
AUDITORY DISTURBANCES: NOT PRESENT
TREMOR: NOT VISIBLE, BUT CAN BE FELT FINGERTIP TO FINGERTIP
SKIP TO QUESTIONS 9-10: 0
PAROXYSMAL SWEATS: BARELY PERCEPTIBLE SWEATING, PALMS MOIST
SKIP TO QUESTIONS 9-10: 0
AUDIT-C TOTAL SCORE: -1
HOW OFTEN DO YOU HAVE A DRINK CONTAINING ALCOHOL: PATIENT DECLINED

## 2024-01-11 ASSESSMENT — PAIN SCALES - GENERAL
PAINLEVEL_OUTOF10: 0 - NO PAIN
PAINLEVEL_OUTOF10: 5 - MODERATE PAIN

## 2024-01-11 ASSESSMENT — PAIN SCALES - WONG BAKER: WONGBAKER_NUMERICALRESPONSE: NO HURT

## 2024-01-11 ASSESSMENT — PAIN - FUNCTIONAL ASSESSMENT: PAIN_FUNCTIONAL_ASSESSMENT: CPOT (CRITICAL CARE PAIN OBSERVATION TOOL)

## 2024-01-11 NOTE — CARE PLAN
The patient's goals for the shift include      The clinical goals for the shift include Pt to keep sats above 88%    Over the shift, the patient did not make progress toward the following goals. Barriers to progression include confusion and withdrawal. Recommendations to address these barriers include continue withdrawal medication and closely monitor.

## 2024-01-11 NOTE — DOCUMENTATION CLARIFICATION NOTE
"    PATIENT:               ENDY HIRSCH  ACCT #:                  [id number]  MRN:                       [id number]  :                       1955  ADMIT DATE:       2024 12:39 AM  DISCH DATE:  RESPONDING PROVIDER #:        [id number]          PROVIDER RESPONSE TEXT:    Sepsis with organ dysfunction - lactic acidosis, hypoglycemia    CDI QUERY TEXT:    [facility]_Sepsis    Instruction:  Based on your assessment of the patient and the clinical information, please provide the requested documentation by clicking on the appropriate radio button and enter any additional information if prompted.    Question: Is there a diagnosis indicative of a patient meeting SIRS criteria and with organ dysfunction in the setting of Influenza A with Pneumonia infection    When answering this query, please exercise your independent professional judgment. The fact that a question is being asked, does not imply that any particular answer is desired or expected.    The patient's clinical indicators include:  Clinical Information:  69 yo admitted with Influenza, Pneumonia and Acute respiratory failure with hypoxia    Clinical Indicators:  ED  He is cachectic.  He is ill-appearing.  Tachycardia  Wheezing, evidence of acute kidney injury and persistent hypoglycemia    H and P  \"Found to have influenza A and multifocal pneumonia.  He was also in atrial fibrillation with rapid ventricular rate\"    24  Temp 97.2-97.9    HR        RR 17-60      WBCs with 40 percent bands  Lactate 5.9-12.8    Creatinine 2.6    Chest x-ray was obtained which does show dense right lower lobe pneumonia.    Treatment:  Critical care cardiorespiratory and neurologic monitoring, HFNC, IV antibiotics, D10, lab monitoring, LR bolus 1000 mls x 2, Tamiflu    Risk Factors:  69 yo with history of polysubstance abuse, Hepatitis C, cachexia presenting with Influenza A and pneumonia  Options provided:  -- Sepsis with renal organ dysfunction - ZAMZAM  -- Sepsis with cardiac " organ dysfunction - afib with RVR  -- Sepsis with metabolic organ dysfunction - lactic acidosis, hypoglycemia  -- Sepsis with multi-system organ dysfunction - lactic acidosis, hypoglycemia, ZAMZAM, afib with RVR  -- Sepsis with other organ dysfunction, Please specify additional information below  -- Patient treated for Influenza A with pneumonia without sepsis  -- Other - I will add my own diagnosis  -- Refer to Clinical Documentation Reviewer    Query created by: Margarita Mahajan on 1/11/2024 1:49 PM      Electronically signed by:  RIOS BELL DO 1/11/2024 1:55 PM

## 2024-01-11 NOTE — PROGRESS NOTES
Subjective  A complete 10 point review of systems was completed and is otherwise negative unless stated.       Physical exam  Physical Exam:   General appearance: Altered, cachectic, poorly redirectable, fidgety and anxious  HEENT: Edentulous  Neck: no obvious deformity, JVP WNL  Respiratory: Use of accessory muscles including scalenes intercostals, appropriate respiratory effort  Cardiovascular: Irregular tachycardia  Abdomen: no organomegaly, no tenderness to palpation in all quadrants  Extremities: Frail musculature  Skin: intact, no rashes   Neurologic: Tangential thoughts, fidgety and anxious, mildly tremulous, redirectable however at times uncooperative  Psych: Difficult to assess     Remainder of objective data including imaging and laboratory findings were all reviewed.     Assessment and plan:  Brief admission history:  68-year-old with history of polysubstance abuse, hepatitis C.  Presented to hospital short of breath and feeling lethargic.  Elevated liver enzymes, low glucose, possible ZAMZAM.  Also significant pneumonia with possible parapneumonic effusion.  In atrial fibrillation with rapid ventricular rate, on oxygen.  Admitted to ICU in setting of multiorgan dysfunction.    Daily progress:  1/11: Patient remained in atrial fibrillation with RVR throughout the day yesterday, initiate amiodarone infusion yesterday afternoon resulting in normal sinus rhythm this morning with a rate in the 70s, blood pressure is excellent after a few fluid boluses over the last few days, blood cultures growing Streptococcus pneumonia which is pan sensitive including oral agents (amoxicillin/clavulanate, TMP-SMX), oxygen requirements are still bit high we will attempt to examine his pleural space today to see if he has any tappable parapneumonic effusion and will send cultures and studies if this is the case, mentation is about the same however he is fidgety and agitated likely because he is restrained, patient is still  critically ill however is stable for transfer to floor, discussed with the medicine service who has accepted the patient     Neurologic:  # Acute toxic encephalopathy  # Substance abuse  # EtOH withdrawal, suspect  Continue buprenorphine  EtOH withdrawal, phenobarbital protocol    Cardiovascular:  # Atrial fibrillation with rapid ventricular rate, paroxysmal  In the setting of numerous metabolic abnormalities  Metoprolol 25 mg twice daily, amiodarone infusion initiated 1/10  Can likely put on p.o. amiodarone for a few days to ensure adequate tissue concentration  [id number] warrants anticoagulation however several social factors confounding this  For now, hold anticoagulation while in sinus    Respiratory:  # Acute hypoxic respiratory failure  # Tobacco abuse  # Multifocal pneumonia with possible parapneumonic effusion  # Influenza A  Respiratory support with high flow oxygen alternating with nonrebreather  Streptococcus pneumonia, suspect  Coronado sensitive on blood culture, ceftriaxone or similar for 14 days  Continue oseltamavir     Gastrointestinal:  # Hepatitis C  # Alcohol hepatitis, possible  Hold off on steroid for now     Renal:  # Possible ZAMZAM  # HAGMA  # Lactic acidosis  # Prerenal azotemia  Hemodynamic ZAMZAM, creatinine stable, BUN elevated  Suspect prerenal etiology, low suspicion for GI bleed  Hold steroids as uremic encephalopathy will be a difficult condition to correct in this patient     Hematologic/oncologic:  # Leukopenia  Insetting of infection     Infectious disease:  # Community-acquired pneumonia with likely parapneumonic effusion  # Bloodstream infection, Streptococcus pneumonia  Strep pneumonia, antigen positive as well as blood cultures  Pansensitive, ideally switch to p.o. antibiotics given difficult social situation   Unlikely to be amenable to PICC line or rehab  Of note, MRSA nares positive however given clinical improvement we will not treat this    Endocrine:  No issues      Tubes:-Airvo  Lines: Peripheral IVs, right IJ  Devices: None  Fluids: As needed  Antibiotics: Ceftriaxone  Prophylaxis: Heparin  CODE STATUS: Full     This is a prelim note, please await attending attestation.     Seth Echevarria, DO     This note was entered with the assistance of dictation software, please excuse any grammatical errors or brevity.

## 2024-01-11 NOTE — PROGRESS NOTES
SW talked with RN to gauge appropriateness of discussing polysubstance use. RN reports patient is confused and not appropriate at this time. SW will follow up when appropriate.  will continue to follow. Message with questions.  YARIEL Nance

## 2024-01-11 NOTE — CONSULTS
Consults    Reason For Consult  Medical management    History Of Present Illness  Aman Leija is a 68 y.o. male with a history of hepatitis C, polysubstance abuse who presented to [facility] complaining of shortness of breath.  Evaluation in the emergency department revealed patient to be in A-fib RVR, influenza A positive with a superimposed bacterial pneumonia.  Patient was admitted to medical ICU for unstable respiratory status potential early shock.  In the ICU patient was started on CIWA protocol, he was started on amiodarone drip for his A-fib RVR, ABX/Tamiflu for influenza/superimposed bacterial pneumonia.  During his stay in the ICU his A-fib RVR resolved, and his oxygen requirements improved.  Patient was transferred to floor for further medical management.    Past Medical History  As mentioned above    Surgical History  Patient denies any surgical history     Social History  Patient currently denying tobacco use, alcohol use, drug use but has mentioned to other physicians that he uses street drugs and drinks alcohol daily along with smoking.      Family History  Patient denying any family history     Allergies  Patient has no known allergies.    Review of Systems  A 10 point review of systems was performed and was negative as mentioned above     Physical Exam  Constitutional: Frail, cachectic, anxious appearing, confused  ENMT: mucous membranes dry, conjunctivae clear  Head/Neck: normocephalic, atraumatic; supple, trachea midline  Respiratory/Thorax: patent airways, tachypneic  Cardiovascular: RRR, no murmur  Gastrointestinal: soft, nondistended, non-tender, bowel sounds appreciated  Extremities: palpable peripheral pulses, no edema or cyanosis, in 2-point restraints  Neurological: When asked questions, patient does not answer appropriately, not redirectable.  Psychological: appropriate mood and behavior  Skin: warm and dry      Last Recorded Vitals  /78   Pulse 67    Temp 36.2 °C (97.2 °F) (Temporal)   Resp 25   Wt 54.4 kg (119 lb 14.9 oz)   SpO2 94%      Assessment/Plan   Aman Leija is a 68 y.o. male with a history of hepatitis C, polysubstance abuse who presented to [facility] complaining of shortness of breath.    Acute:  #Influenza A  #Community-acquired pneumonia,suspect parapneumonic effusion  #Acute hypoxic respiratory failure requiring supplemental oxygen  #Leukocytosis likely secondary to respiratory tract infection  #Sepsis without shock, Streptococcus pneumonia bacteremia and pneumonia   -Azithromycin day 3/3  -Ceftriaxone day day 1  -Tamiflu day 3/5  -WBC 16.5, will continue to monitor  -Procalcitonin day 88.77  -Blood x2 cultures positive for strep pneumonia   -MRSA nares positive    #Acute alcohol withdrawal  -phenobarb scheduled and PRN    #ZAMZAM-improving  #Prerenal azotemia  #Hypermagnesemia  -Patient's baseline creatinine unknown, 2.06 on presentation currently downtrending  -Will continue to monitor kidney function and electrolytes  -LR 250ml/hr    #Elevated LFTs, suspect secondary to alcohol abuse  -Will continue to monitor    #New onset A-fib RVR-in NSR  -Diltiazem and digoxin provided in the ED  -Continue amiodarone drip  -Continue Lopressor 25 mg twice daily    Chronic:  #History of hepatitis C  #History of polysubstance abuse  -Continue buprenorphine 8 mg twice daily  -Continue nicotine patch  -[facility] protocol  -Continue thiamine    DVT prophylaxis: Subcu heparin  Diet: Adult regular, minced and moist food  O2: 3 L nasal cannula  Fluids: LR 250ml/hr  Drips: Amiodarone  Antibiotics: Rocephin day 1  CODE STATUS: Full code    Dwaine Byrd DO, PGY-1  Internal Medicine

## 2024-01-12 LAB
ALBUMIN SERPL BCP-MCNC: 1.9 G/DL (ref 3.4–5)
ALP SERPL-CCNC: 469 U/L (ref 33–136)
ALT SERPL W P-5'-P-CCNC: 68 U/L (ref 10–52)
ANION GAP SERPL CALC-SCNC: 12 MMOL/L (ref 10–20)
AST SERPL W P-5'-P-CCNC: 175 U/L (ref 9–39)
BACTERIA BLD AEROBE CULT: ABNORMAL
BACTERIA BLD AEROBE CULT: ABNORMAL
BACTERIA BLD CULT: ABNORMAL
BACTERIA BLD CULT: ABNORMAL
BASOPHILS # BLD MANUAL: 0 X10*3/UL (ref 0–0.1)
BASOPHILS NFR BLD MANUAL: 0 %
BILIRUB DIRECT SERPL-MCNC: 1.3 MG/DL (ref 0–0.3)
BILIRUB SERPL-MCNC: 2.1 MG/DL (ref 0–1.2)
BUN SERPL-MCNC: 99 MG/DL (ref 6–23)
BURR CELLS BLD QL SMEAR: ABNORMAL
CALCIUM SERPL-MCNC: 7.2 MG/DL (ref 8.6–10.3)
CHLORIDE SERPL-SCNC: 100 MMOL/L (ref 98–107)
CO2 SERPL-SCNC: 29 MMOL/L (ref 21–32)
CREAT SERPL-MCNC: 1.24 MG/DL (ref 0.5–1.3)
CRP SERPL-MCNC: 17.03 MG/DL
EGFRCR SERPLBLD CKD-EPI 2021: 63 ML/MIN/1.73M*2
EOSINOPHIL # BLD MANUAL: 0 X10*3/UL (ref 0–0.7)
EOSINOPHIL NFR BLD MANUAL: 0 %
ERYTHROCYTE [DISTWIDTH] IN BLOOD BY AUTOMATED COUNT: 13.2 % (ref 11.5–14.5)
GLUCOSE SERPL-MCNC: 176 MG/DL (ref 74–99)
GRAM STN SPEC: ABNORMAL
HCT VFR BLD AUTO: 37.9 % (ref 41–52)
HGB BLD-MCNC: 13 G/DL (ref 13.5–17.5)
HOLD SPECIMEN: NORMAL
IMM GRANULOCYTES # BLD AUTO: 0.34 X10*3/UL (ref 0–0.7)
IMM GRANULOCYTES NFR BLD AUTO: 2.5 % (ref 0–0.9)
LACTATE SERPL-SCNC: 2.2 MMOL/L (ref 0.4–2)
LYMPHOCYTES # BLD MANUAL: 0.28 X10*3/UL (ref 1.2–4.8)
LYMPHOCYTES NFR BLD MANUAL: 2 %
MAGNESIUM SERPL-MCNC: 3.24 MG/DL (ref 1.6–2.4)
MCH RBC QN AUTO: 30.6 PG (ref 26–34)
MCHC RBC AUTO-ENTMCNC: 34.3 G/DL (ref 32–36)
MCV RBC AUTO: 89 FL (ref 80–100)
MONOCYTES # BLD MANUAL: 0.14 X10*3/UL (ref 0.1–1)
MONOCYTES NFR BLD MANUAL: 1 %
NEUTROPHILS # BLD MANUAL: 13.39 X10*3/UL (ref 1.2–7.7)
NEUTS BAND # BLD MANUAL: 0.83 X10*3/UL (ref 0–0.7)
NEUTS BAND NFR BLD MANUAL: 6 %
NEUTS SEG # BLD MANUAL: 12.56 X10*3/UL (ref 1.2–7)
NEUTS SEG NFR BLD MANUAL: 91 %
NRBC BLD-RTO: 1.6 /100 WBCS (ref 0–0)
PHOSPHATE SERPL-MCNC: 3.9 MG/DL (ref 2.5–4.9)
PLATELET # BLD AUTO: 28 X10*3/UL (ref 150–450)
POTASSIUM SERPL-SCNC: 4.3 MMOL/L (ref 3.5–5.3)
PROT SERPL-MCNC: 5 G/DL (ref 6.4–8.2)
RBC # BLD AUTO: 4.25 X10*6/UL (ref 4.5–5.9)
RBC MORPH BLD: ABNORMAL
SODIUM SERPL-SCNC: 137 MMOL/L (ref 136–145)
TOTAL CELLS COUNTED BLD: 100
WBC # BLD AUTO: 13.8 X10*3/UL (ref 4.4–11.3)

## 2024-01-12 PROCEDURE — 84100 ASSAY OF PHOSPHORUS: CPT | Performed by: STUDENT IN AN ORGANIZED HEALTH CARE EDUCATION/TRAINING PROGRAM

## 2024-01-12 PROCEDURE — S4991 NICOTINE PATCH NONLEGEND: HCPCS | Performed by: STUDENT IN AN ORGANIZED HEALTH CARE EDUCATION/TRAINING PROGRAM

## 2024-01-12 PROCEDURE — 80053 COMPREHEN METABOLIC PANEL: CPT | Performed by: STUDENT IN AN ORGANIZED HEALTH CARE EDUCATION/TRAINING PROGRAM

## 2024-01-12 PROCEDURE — 2020000001 HC ICU ROOM DAILY

## 2024-01-12 PROCEDURE — 83605 ASSAY OF LACTIC ACID: CPT | Performed by: STUDENT IN AN ORGANIZED HEALTH CARE EDUCATION/TRAINING PROGRAM

## 2024-01-12 PROCEDURE — 85027 COMPLETE CBC AUTOMATED: CPT | Performed by: STUDENT IN AN ORGANIZED HEALTH CARE EDUCATION/TRAINING PROGRAM

## 2024-01-12 PROCEDURE — 94660 CPAP INITIATION&MGMT: CPT

## 2024-01-12 PROCEDURE — 2500000004 HC RX 250 GENERAL PHARMACY W/ HCPCS (ALT 636 FOR OP/ED): Performed by: INTERNAL MEDICINE

## 2024-01-12 PROCEDURE — 37799 UNLISTED PX VASCULAR SURGERY: CPT

## 2024-01-12 PROCEDURE — 86140 C-REACTIVE PROTEIN: CPT | Performed by: STUDENT IN AN ORGANIZED HEALTH CARE EDUCATION/TRAINING PROGRAM

## 2024-01-12 PROCEDURE — 2500000004 HC RX 250 GENERAL PHARMACY W/ HCPCS (ALT 636 FOR OP/ED): Performed by: STUDENT IN AN ORGANIZED HEALTH CARE EDUCATION/TRAINING PROGRAM

## 2024-01-12 PROCEDURE — 2500000002 HC RX 250 W HCPCS SELF ADMINISTERED DRUGS (ALT 637 FOR MEDICARE OP, ALT 636 FOR OP/ED): Performed by: STUDENT IN AN ORGANIZED HEALTH CARE EDUCATION/TRAINING PROGRAM

## 2024-01-12 PROCEDURE — 85007 BL SMEAR W/DIFF WBC COUNT: CPT | Performed by: STUDENT IN AN ORGANIZED HEALTH CARE EDUCATION/TRAINING PROGRAM

## 2024-01-12 PROCEDURE — 82248 BILIRUBIN DIRECT: CPT | Performed by: STUDENT IN AN ORGANIZED HEALTH CARE EDUCATION/TRAINING PROGRAM

## 2024-01-12 PROCEDURE — 2500000005 HC RX 250 GENERAL PHARMACY W/O HCPCS: Performed by: STUDENT IN AN ORGANIZED HEALTH CARE EDUCATION/TRAINING PROGRAM

## 2024-01-12 PROCEDURE — 83735 ASSAY OF MAGNESIUM: CPT | Performed by: STUDENT IN AN ORGANIZED HEALTH CARE EDUCATION/TRAINING PROGRAM

## 2024-01-12 PROCEDURE — 37799 UNLISTED PX VASCULAR SURGERY: CPT | Performed by: STUDENT IN AN ORGANIZED HEALTH CARE EDUCATION/TRAINING PROGRAM

## 2024-01-12 PROCEDURE — 71045 X-RAY EXAM CHEST 1 VIEW: CPT | Performed by: RADIOLOGY

## 2024-01-12 PROCEDURE — 2500000004 HC RX 250 GENERAL PHARMACY W/ HCPCS (ALT 636 FOR OP/ED)

## 2024-01-12 RX ORDER — LORAZEPAM 2 MG/ML
2 INJECTION INTRAMUSCULAR ONCE
Status: COMPLETED | OUTPATIENT
Start: 2024-01-12 | End: 2024-01-12

## 2024-01-12 RX ORDER — MUPIROCIN 20 MG/G
OINTMENT TOPICAL 2 TIMES DAILY
Status: CANCELLED | OUTPATIENT
Start: 2024-01-12 | End: 2024-01-16

## 2024-01-12 RX ORDER — DEXMEDETOMIDINE HYDROCHLORIDE 4 UG/ML
INJECTION, SOLUTION INTRAVENOUS
Status: COMPLETED
Start: 2024-01-12 | End: 2024-01-12

## 2024-01-12 RX ORDER — DEXMEDETOMIDINE HYDROCHLORIDE 4 UG/ML
.1-1.5 INJECTION, SOLUTION INTRAVENOUS CONTINUOUS
Status: DISCONTINUED | OUTPATIENT
Start: 2024-01-12 | End: 2024-01-15

## 2024-01-12 RX ADMIN — CEFTRIAXONE SODIUM 2 G: 2 INJECTION, SOLUTION INTRAVENOUS at 10:25

## 2024-01-12 RX ADMIN — THIAMINE HYDROCHLORIDE 500 MG: 100 INJECTION, SOLUTION INTRAMUSCULAR; INTRAVENOUS at 12:27

## 2024-01-12 RX ADMIN — DEXMEDETOMIDINE HYDROCHLORIDE 0.58 MCG/KG/HR: 400 INJECTION INTRAVENOUS at 17:28

## 2024-01-12 RX ADMIN — DEXMEDETOMIDINE HYDROCHLORIDE 0.2 MCG/KG/HR: 400 INJECTION INTRAVENOUS at 01:00

## 2024-01-12 RX ADMIN — Medication 1 PATCH: at 10:21

## 2024-01-12 RX ADMIN — LIDOCAINE 1 PATCH: 4 PATCH TOPICAL at 10:21

## 2024-01-12 RX ADMIN — PHENOBARBITAL SODIUM 32.5 MG: 65 INJECTION INTRAMUSCULAR at 10:21

## 2024-01-12 RX ADMIN — LORAZEPAM 2 MG: 2 INJECTION INTRAMUSCULAR; INTRAVENOUS at 00:50

## 2024-01-12 ASSESSMENT — PAIN - FUNCTIONAL ASSESSMENT
PAIN_FUNCTIONAL_ASSESSMENT: CPOT (CRITICAL CARE PAIN OBSERVATION TOOL)

## 2024-01-12 ASSESSMENT — PAIN SCALES - GENERAL
PAINLEVEL_OUTOF10: 7
PAINLEVEL_OUTOF10: 0 - NO PAIN
PAINLEVEL_OUTOF10: 2
PAINLEVEL_OUTOF10: 0 - NO PAIN

## 2024-01-12 ASSESSMENT — LIFESTYLE VARIABLES
TREMOR: NOT VISIBLE, BUT CAN BE FELT FINGERTIP TO FINGERTIP
PAROXYSMAL SWEATS: BARELY PERCEPTIBLE SWEATING, PALMS MOIST
VISUAL DISTURBANCES: NOT PRESENT
ORIENTATION AND CLOUDING OF SENSORIUM: DISORIENTED FOR PLACE OR PERSON
VISUAL DISTURBANCES: NOT PRESENT
TREMOR: NO TREMOR
HEADACHE, FULLNESS IN HEAD: NOT PRESENT
TREMOR: NOT VISIBLE, BUT CAN BE FELT FINGERTIP TO FINGERTIP
AGITATION: NORMAL ACTIVITY
TOTAL SCORE: 6
HEADACHE, FULLNESS IN HEAD: NOT PRESENT
ANXIETY: MODERATELY ANXIOUS, OR GUARDED, SO ANXIETY IS INFERRED
AGITATION: MODERATELY FIDGETY AND RESTLESS
AGITATION: NORMAL ACTIVITY
ANXIETY: NO ANXIETY, AT EASE
ANXIETY: NO ANXIETY, AT EASE
TOTAL SCORE: 5
NAUSEA AND VOMITING: NO NAUSEA AND NO VOMITING
AGITATION: 5
TOTAL SCORE: 15
AUDITORY DISTURBANCES: NOT PRESENT
ORIENTATION AND CLOUDING OF SENSORIUM: DISORIENTED FOR PLACE OR PERSON
ORIENTATION AND CLOUDING OF SENSORIUM: DISORIENTED FOR PLACE OR PERSON
ANXIETY: NO ANXIETY, AT EASE
AUDITORY DISTURBANCES: NOT PRESENT
NAUSEA AND VOMITING: NO NAUSEA AND NO VOMITING
HEADACHE, FULLNESS IN HEAD: NOT PRESENT
TREMOR: NO TREMOR
TOTAL SCORE: 9
PAROXYSMAL SWEATS: BARELY PERCEPTIBLE SWEATING, PALMS MOIST
AUDITORY DISTURBANCES: NOT PRESENT
VISUAL DISTURBANCES: NOT PRESENT
PAROXYSMAL SWEATS: BARELY PERCEPTIBLE SWEATING, PALMS MOIST
HEADACHE, FULLNESS IN HEAD: NOT PRESENT
VISUAL DISTURBANCES: NOT PRESENT
NAUSEA AND VOMITING: NO NAUSEA AND NO VOMITING
ORIENTATION AND CLOUDING OF SENSORIUM: DISORIENTED FOR PLACE OR PERSON
NAUSEA AND VOMITING: NO NAUSEA AND NO VOMITING
AUDITORY DISTURBANCES: NOT PRESENT
PAROXYSMAL SWEATS: BARELY PERCEPTIBLE SWEATING, PALMS MOIST

## 2024-01-12 ASSESSMENT — COGNITIVE AND FUNCTIONAL STATUS - GENERAL
TOILETING: TOTAL
TURNING FROM BACK TO SIDE WHILE IN FLAT BAD: TOTAL
MOVING FROM LYING ON BACK TO SITTING ON SIDE OF FLAT BED WITH BEDRAILS: TOTAL
MOVING TO AND FROM BED TO CHAIR: TOTAL
EATING MEALS: TOTAL
PERSONAL GROOMING: TOTAL
WALKING IN HOSPITAL ROOM: TOTAL
HELP NEEDED FOR BATHING: TOTAL
DAILY ACTIVITIY SCORE: 6
CLIMB 3 TO 5 STEPS WITH RAILING: TOTAL
STANDING UP FROM CHAIR USING ARMS: TOTAL
DRESSING REGULAR LOWER BODY CLOTHING: TOTAL
MOBILITY SCORE: 6
DRESSING REGULAR UPPER BODY CLOTHING: TOTAL

## 2024-01-12 NOTE — PROGRESS NOTES
Nutrition Progress Note  Pt is restless and restrained.  He is also confused,  A minced moist diet was ordered but pt is too confused to take po at this time.  Will follow clinical course for any nutrition intervention needs.

## 2024-01-12 NOTE — PROGRESS NOTES
Speech-Language Pathology                 Therapy Communication Note    Patient Name: Aman Leija  MRN: [id number]  Today's Date: 1/12/2024     Discipline: Speech Language Pathology    Missed Visit Reason:    Pt not appropriate for a swallowing exam or PO at this time d/t unstable respiratory skills requiring continuous BIPAP.     Missed Time: Attempt    Comment: d/w RN Thelma with order to be discharged at this  time with SLP to be re -consulted when pt more stable and off Bipap.   Continue NPO status.

## 2024-01-12 NOTE — PROGRESS NOTES
Subjective  A complete 10 point review of systems was completed and is otherwise negative unless stated.       Physical exam  Physical Exam:   General appearance: Altered, cachectic, poorly redirectable, fidgety and anxious  HEENT: Edentulous  Neck: no obvious deformity, JVP WNL  Respiratory: Use of accessory muscles including scalenes intercostals, appropriate respiratory effort  Cardiovascular: Irregular tachycardia  Abdomen: no organomegaly, no tenderness to palpation in all quadrants  Extremities: Frail musculature  Skin: intact, no rashes   Neurologic: Tangential thoughts, fidgety and anxious, mildly tremulous, redirectable however at times uncooperative  Psych: Difficult to assess     Remainder of objective data including imaging and laboratory findings were all reviewed.     Assessment and plan:  Brief admission history:  68-year-old with history of polysubstance abuse, hepatitis C.  Presented to hospital short of breath and feeling lethargic.  Elevated liver enzymes, low glucose, possible ZAMZAM.  Also significant pneumonia with possible parapneumonic effusion.  In atrial fibrillation with rapid ventricular rate, on oxygen.  Admitted to ICU in setting of multiorgan dysfunction.    Daily progress:  1/12: Patient was put on dexmedetomidine overnight for agitation, as such did not get most of his oral medications including amiodarone and metoprolol this morning, he is likely oversedated and will turn his dexmedetomidine down, he is on CPAP as he kept desaturating, we will look at his pleural effusion today to see if there is any room for intervention to help alleviate some of his hypoxemia, continue management with buprenorphine/phenobarbital/ceftriaxone/supportive care, once we get him off dexmedetomidine and tap his effusion will send him to the floor     Neurologic:  # Acute toxic encephalopathy  # Substance abuse  # EtOH withdrawal, suspect  Continue buprenorphine  EtOH withdrawal, phenobarbital  protocol  Dexmedetomidine as needed    Cardiovascular:  # Atrial fibrillation with rapid ventricular rate, paroxysmal  Metoprolol 25 mg twice daily, p.o. amiodarone  [id number] warrants anticoagulation however several social factors confounding this  For now, hold anticoagulation while in sinus    Respiratory:  # Acute hypoxic respiratory failure  # Tobacco abuse  # Multifocal pneumonia with possible parapneumonic effusion  # Influenza A  Respiratory support with high flow oxygen alternating with nonrebreather  Streptococcus pneumonia, suspect  Coronado sensitive on blood culture, ceftriaxone or similar for 14 days  Continue oseltamavir  1/12: Will examine pleural effusion     Gastrointestinal:  # Hepatitis C  # Alcohol hepatitis, possible  Hold off on steroid for now     Renal:  # Possible ZAMZAM  # HAGMA  # Lactic acidosis  # Prerenal azotemia  Hemodynamic ZAMZAM, creatinine stable, BUN elevated; improving  Hold steroids as uremic encephalopathy will be a difficult condition to correct in this patient     Hematologic/oncologic:  # Leukopenia  Insetting of infection     Infectious disease:  # Community-acquired pneumonia with likely parapneumonic effusion  # Bloodstream infection, Streptococcus pneumonia  Strep pneumonia, antigen positive as well as blood cultures  Pansensitive, 2g CTX for now   Unlikely to be amenable to PICC line or rehab  Of note, MRSA nares positive however given clinical improvement we will not treat this    Endocrine:  No issues     Tubes:-Airvo  Lines: Peripheral IVs, right IJ  Devices: None  Fluids: As needed  Antibiotics: Ceftriaxone  Prophylaxis: Heparin  CODE STATUS: Full     This is a prelim note, please await attending attestation.     Seth Echevarria, DO     This note was entered with the assistance of dictation software, please excuse any grammatical errors or brevity.

## 2024-01-12 NOTE — CARE PLAN
Problem: Pain  Goal: My pain/discomfort is manageable  Outcome: Progressing     Problem: Safety  Goal: Patient will be injury free during hospitalization  Outcome: Progressing  Goal: I will remain free of falls  Outcome: Progressing     Problem: Daily Care  Goal: Daily care needs are met  Outcome: Progressing   The patient's goals for the shift include      The clinical goals for the shift include Maintain SpO2 greater than 92%.  Patient placed on CPAP overnight and able to maintain SpO2 of 92 or greater with CPAP on.     Barriers to progression include agitation and confusion. Continue to administer ordered interventions and medications.

## 2024-01-12 NOTE — PROGRESS NOTES
Aman Leija is a 68 y.o. male on day 3 of admission presenting with Influenza A.      Subjective   Patient seen and examined at bedside.  Patient on Precedex drip.    Objective     Last Recorded Vitals  /70   Pulse 73   Temp 36.1 °C (97 °F) (Temporal)   Resp 20   Wt 56.6 kg (124 lb 12.5 oz)   SpO2 96%   Intake/Output last 3 Shifts:    Intake/Output Summary (Last 24 hours) at 1/12/2024 1540  Last data filed at 1/12/2024 0600  Gross per 24 hour   Intake 359.86 ml   Output 400 ml   Net -40.14 ml       Admission Weight  Weight: 54.4 kg (120 lb) (01/09/24 0039)    Daily Weight  01/12/24 : 56.6 kg (124 lb 12.5 oz)    Image Results  XR chest 1 view  Narrative: Interpreted By:  Cande Sam,   STUDY:  XR CHEST 1 VIEW;  1/12/2024 12:21 am      INDICATION:  Signs/Symptoms:worsening hypoxia.      COMPARISON:  01/09/2024      ACCESSION NUMBER(S):  [id number]      ORDERING CLINICIAN:  HORTENCIA HAM      FINDINGS:  Right IJ catheter with tip at the level of the mid SVC.      CARDIOMEDIASTINAL SILHOUETTE:  Cardiomediastinal silhouette is stable in size and configuration.      LUNGS:  Stable dense consolidation involving the right mid to lower lung and  right pleural effusion. Left perihilar and basilar airspace opacities  are slightly increased. Diffuse interstitial prominence again noted.  No pneumothorax. Stable small left pleural effusion.      ABDOMEN:  No remarkable upper abdominal findings.      BONES:  No acute osseous abnormality.      Impression: Multilobar airspace disease, consistent with pneumonia, slightly  increased on the left.      Grossly stable pleural effusions.      Stable interstitial prominence suggestive of interstitial edema.      MACRO:  None      Signed by: Cande Sam 1/12/2024 1:13 AM  Dictation workstation:   [id number]      Physical Exam  Constitutional: Frail, cachectic, sleeping comfortably  ENMT: mucous membranes dry, conjunctivae clear  Head/Neck: normocephalic, atraumatic;  supple, trachea midline  Respiratory/Thorax: patent airways, tachypneic  Cardiovascular: RRR, no murmur  Gastrointestinal: soft, nondistended, non-tender, bowel sounds appreciated  Extremities: palpable peripheral pulses, no edema or cyanosis  Neurological: Unable to assess properly due to patient being sedated with Precedex  Psychological: Unable to assess appropriately  Skin: warm and dry    Assessment/Plan   Aman Leija is a 68 y.o. male with a history of hepatitis C, polysubstance abuse who presented to [facility] complaining of shortness of breath.    1/12: Patient started on Precedex drip due to increasing agitation overnight, continue antibiotics, transitioned to oral amiodarone, wean Precedex. Fluids discontinued     Acute:  #Influenza A  #Community-acquired pneumonia,suspect parapneumonic effusion  #Acute hypoxic respiratory failure requiring supplemental oxygen  #Leukocytosis likely secondary to respiratory tract infection - Improving  #Sepsis without shock, Streptococcus pneumonia bacteremia and pneumonia   -Azithromycin day 3/3  -Ceftriaxone day day 2  -Tamiflu day 4/5  -WBC 13.8, will continue to monitor  -Procalcitonin 88.77  -Blood x2 cultures positive for strep pneumonia   -MRSA nares positive     #Acute alcohol withdrawal  -phenobarb scheduled and PRN     #ZAMZAM-improving  #Prerenal azotemia  #Hypermagnesemia  -Patient's baseline creatinine unknown, 2.06 on presentation currently downtrending  -Will continue to monitor kidney function and electrolytes    #Elevated LFTs, suspect secondary to alcohol abuse  -Will continue to monitor     #New onset A-fib RVR-in NSR  -Diltiazem and digoxin provided in the ED  -Continue amiodarone drip  -Continue Lopressor 25 mg twice daily     Chronic:  #History of hepatitis C  #History of polysubstance abuse  -Continue buprenorphine 8 mg twice daily  -Continue nicotine patch  -WA protocol  -Continue thiamine     DVT prophylaxis: Subcu  heparin  Diet: Adult regular, minced and moist food  O2: 3 L nasal cannula  Fluids: As needed  Drips: Amiodarone  Antibiotics: Rocephin day 2  CODE STATUS: Full code     Dwaine Byrd DO, PGY-1  Internal Medicine

## 2024-01-12 NOTE — CONSULTS
"Nutrition Note  Reason for Assessment  Reason for Assessment:  (follow up)    Visit Reason: weakness   Recommendation(s):  If pt continues to not be able to take po and his gut is functioning recommend TF of Vital 1.5, start at 20 ml hr and increase, as tolerated, 10 ml every 4 hours to goal rate of 50 ml hr 24. This will provide 1800 jas and 81 gm pro with 917 ml fluid toward fluid needs. Add water flushes of 150 ml X 4 for a total of 1517 ml fluid per day or 19 ml kg of IBW. Monitor phos and mag for signs of re feeding, Monitor labs and toleration of feeding, monitor for any needed changes in water flushes         Nutrition Assessment    Nutrition History  Food and Nutrient History: Pt was eating yesterday and seemed to be doing well on a minced moist diet.  He was agitated overnight  and is on precedex.  He was on a breathing mask today and was not able to participate in a swallow eval.  He is now NPO      Difficulty swallowing: pt is too lethargic today to take PO  Per Flowsheet Percent Meal intake: 0  Dietary Orders (From admission, onward)       Start     Ordered    01/11/24 2345  NPO Diet; Effective now  Diet effective now         01/11/24 2345                  Assistive person  Results from last 7 days   Lab Units 01/12/24  0512 01/11/24  0543 01/10/24  0446   GLUCOSE mg/dL 176* 147* 90   SODIUM mmol/L 137 134* 135*   POTASSIUM mmol/L 4.3 4.5 4.8   CHLORIDE mmol/L 100 98 98   CO2 mmol/L 29 26 27   BUN mg/dL 99* 108* 91*   CREATININE mg/dL 1.24 1.56* 1.94*   EGFR mL/min/1.73m*2 63 47* 37*   CALCIUM mg/dL 7.2* 7.5* 7.0*   PHOSPHORUS mg/dL 3.9 3.7 4.7   MAGNESIUM mg/dL 3.24* 3.13* 2.89*     No results found for: \"HGBA1C\"  Results from last 7 days   Lab Units 01/10/24  1616 01/10/24  1220 01/10/24  0337 01/10/24  0040 01/09/24  1934 01/09/24  0529 01/09/24  0210 01/09/24  0119   POCT GLUCOSE mg/dL 82 106* 85 96 94 114* 128* 36*     GI per flowsheet:  Gastrointestinal  Gastrointestinal (WDL): Exceptions to " "WDL  Abdomen Inspection: Flat  Abdominal Tenderness: Nontender  Bowel Sounds: All quadrants  Bowel Sounds (All Quadrants): Hypoactive  Last bowel movement documented:    PMH: smoker; pneumonia; flu; dehydration; acidosis; polysubstance abuse; hep c; severe protein calorie malnutrition   Allergies: Patient has no known allergies.     Anthropometrics:  Height: 182.9 cm (6' 0.01\")  Weight: 56.6 kg (124 lb 12.5 oz)  BMI (Calculated): 16.92  IBW: 81 kg  %IBW: 67 %      Weight History / % Weight Change: Records indicate pt was 160 lbs in Oct 2021, 134 lbs in sept of 2022 and 120 lbs this admit.  He was down 26 lbs the first year, 16%,  14 lbs the second year  10% and a total of 26% of his weight in a little over 2 years.  He is 67% of his IBW this admit with a BMI of 16.3             Estimated Nutritional Needs:  Method for Estimating Needs: 7805-3257   MSJ    Method for Estimating Needs: 81-97  1-1.2 gm kg of IBW    Method for Estimating Needs: 3079-5752  as medically indicated   20-30 ml kg of IBW    Nutrition Focused Physical Findings:   Orbital Fat Pads: Severe (dark circles, hollowing and loose skin)  Buccal Fat Pads: Severe (hollow, sunken and narrow face)  Triceps: Severe (negligible fat tissue)  Ribs: Severe (protruding prominent clavicle)    Temporalis: Severe (hollowed scooping depression)  Pectoralis (Clavicular Region): Severe (protruding prominent clavicle)  Deltoid/Trapezius: Severe (squared shoulders, acromion process prominent)  Interosseous: Severe (depressed area between thumb and forefinger)  Quadriceps: Severe (depressions on inner and outer thigh)  Gastrocnemius: Severe (minimal muscle definition)            Pain Score: 0 - No pain  Critical-Care Pain Observation Score:  [0-7]      Nutrition Diagnosis   Patient has Malnutrition Diagnosis: Yes  Diagnosis Status: Ongoing  Malnutrition Diagnosis: Severe malnutrition related to starvation  As Evidenced by: < 50% energy intake compared to estimated needs " for > 1 month.  Severe muscle and fat loss noted on physical exam.  Pt is 67% of his IBW with a BMI of 16.3.  He is down 26% of his weight over the last 27 months.    Patient has Nutrition Diagnosis: Yes  Ongoing  Nutrition Diagnosis 1: Inadequate oral intake  Related to (1): decreased ability to consume sufficient energy  As Evidenced by (1): pt is having trouble eating due to difficulty breathing       Swallowing difficulity  Diagnosis Status (2): New  Related to (2): motor  causes  As Evidenced by (2): pt was not awake enough to participate with SLP and he is now NPO          Nutrition Interventions/Recommendations   Interventions        Individualized Nutrition Prescription Provided for : If pt continues to not be able to take po and his gut is functioning recommend TF of Vital 1.5,  start at 20 ml hr and increase, as tolerated, 10 ml every 4 hours to goal rate of 50 ml hr 24.  This will provide 1800 jas and 81 gm pro with 917 ml fluid toward fluid needs.  Add water flushes of 150 ml X 4 for a total of 1517 ml fluid per day or 19 ml kg of IBW.  Monitor phos and mag for signs of re feeding,  Monitor labs and toleration of feeding, monitor for any needed changes in water flushes    Nutrition Monitoring and Evaluation   Biochemical Data, Medical Tests and Procedures  Monitoring and Evaluation Plan: Electrolyte/renal panel, Glucose/endocrine profile  Food/Nutrient Related History Monitoring  Monitoring and Evaluation Plan: Enteral and parenteral nutrition intake  Enteral and Parenteral Nutrition Intake: Enteral nutrition formula/solution, Enteral nutrition intake    Progress towards goals: Not Met    Education Documentation  No documentation found.      Time Spent (min): 30 minutes  Last Date of Nutrition Visit: 01/12/24  Nutrition Follow-Up Needed?: 3-5 days  Follow up Comment: 1/15   BRIEN

## 2024-01-13 ENCOUNTER — APPOINTMENT (OUTPATIENT)
Dept: RADIOLOGY | Facility: HOSPITAL | Age: 69
DRG: 871 | End: 2024-01-13
Payer: COMMERCIAL

## 2024-01-13 LAB
ALBUMIN SERPL BCP-MCNC: 1.9 G/DL (ref 3.4–5)
ALP SERPL-CCNC: 572 U/L (ref 33–136)
ALT SERPL W P-5'-P-CCNC: 53 U/L (ref 10–52)
ANION GAP SERPL CALC-SCNC: 14 MMOL/L (ref 10–20)
AST SERPL W P-5'-P-CCNC: 101 U/L (ref 9–39)
BASOPHILS # BLD MANUAL: 0 X10*3/UL (ref 0–0.1)
BASOPHILS NFR BLD MANUAL: 0 %
BILIRUB DIRECT SERPL-MCNC: 0.6 MG/DL (ref 0–0.3)
BILIRUB SERPL-MCNC: 1.1 MG/DL (ref 0–1.2)
BUN SERPL-MCNC: 122 MG/DL (ref 6–23)
CALCIUM SERPL-MCNC: 6.9 MG/DL (ref 8.6–10.3)
CHLORIDE SERPL-SCNC: 102 MMOL/L (ref 98–107)
CO2 SERPL-SCNC: 29 MMOL/L (ref 21–32)
CREAT SERPL-MCNC: 1.59 MG/DL (ref 0.5–1.3)
CRP SERPL-MCNC: 18.49 MG/DL
EGFRCR SERPLBLD CKD-EPI 2021: 46 ML/MIN/1.73M*2
EOSINOPHIL # BLD MANUAL: 0 X10*3/UL (ref 0–0.7)
EOSINOPHIL NFR BLD MANUAL: 0 %
ERYTHROCYTE [DISTWIDTH] IN BLOOD BY AUTOMATED COUNT: 14 % (ref 11.5–14.5)
GLUCOSE SERPL-MCNC: 152 MG/DL (ref 74–99)
HCT VFR BLD AUTO: 37.2 % (ref 41–52)
HGB BLD-MCNC: 12.6 G/DL (ref 13.5–17.5)
IMM GRANULOCYTES # BLD AUTO: 0.29 X10*3/UL (ref 0–0.7)
IMM GRANULOCYTES NFR BLD AUTO: 2.2 % (ref 0–0.9)
LACTATE SERPL-SCNC: 1.7 MMOL/L (ref 0.4–2)
LACTATE SERPL-SCNC: 2.2 MMOL/L (ref 0.4–2)
LYMPHOCYTES # BLD MANUAL: 0.53 X10*3/UL (ref 1.2–4.8)
LYMPHOCYTES NFR BLD MANUAL: 4 %
MAGNESIUM SERPL-MCNC: 3.49 MG/DL (ref 1.6–2.4)
MCH RBC QN AUTO: 31.1 PG (ref 26–34)
MCHC RBC AUTO-ENTMCNC: 33.9 G/DL (ref 32–36)
MCV RBC AUTO: 92 FL (ref 80–100)
MONOCYTES # BLD MANUAL: 0.13 X10*3/UL (ref 0.1–1)
MONOCYTES NFR BLD MANUAL: 1 %
NEUTROPHILS # BLD MANUAL: 12.64 X10*3/UL (ref 1.2–7.7)
NEUTS BAND # BLD MANUAL: 1.33 X10*3/UL (ref 0–0.7)
NEUTS BAND NFR BLD MANUAL: 10 %
NEUTS SEG # BLD MANUAL: 11.31 X10*3/UL (ref 1.2–7)
NEUTS SEG NFR BLD MANUAL: 85 %
NRBC BLD-RTO: 2.6 /100 WBCS (ref 0–0)
PHOSPHATE SERPL-MCNC: 7.5 MG/DL (ref 2.5–4.9)
PLATELET # BLD AUTO: 24 X10*3/UL (ref 150–450)
POTASSIUM SERPL-SCNC: 5.2 MMOL/L (ref 3.5–5.3)
PROT SERPL-MCNC: 5.2 G/DL (ref 6.4–8.2)
RBC # BLD AUTO: 4.05 X10*6/UL (ref 4.5–5.9)
RBC MORPH BLD: ABNORMAL
SODIUM SERPL-SCNC: 140 MMOL/L (ref 136–145)
TOTAL CELLS COUNTED BLD: 100
WBC # BLD AUTO: 13.3 X10*3/UL (ref 4.4–11.3)

## 2024-01-13 PROCEDURE — 85007 BL SMEAR W/DIFF WBC COUNT: CPT | Performed by: STUDENT IN AN ORGANIZED HEALTH CARE EDUCATION/TRAINING PROGRAM

## 2024-01-13 PROCEDURE — 80053 COMPREHEN METABOLIC PANEL: CPT

## 2024-01-13 PROCEDURE — 71045 X-RAY EXAM CHEST 1 VIEW: CPT

## 2024-01-13 PROCEDURE — 2500000004 HC RX 250 GENERAL PHARMACY W/ HCPCS (ALT 636 FOR OP/ED): Performed by: INTERNAL MEDICINE

## 2024-01-13 PROCEDURE — 5A09357 ASSISTANCE WITH RESPIRATORY VENTILATION, LESS THAN 24 CONSECUTIVE HOURS, CONTINUOUS POSITIVE AIRWAY PRESSURE: ICD-10-PCS | Performed by: INTERNAL MEDICINE

## 2024-01-13 PROCEDURE — 71045 X-RAY EXAM CHEST 1 VIEW: CPT | Performed by: RADIOLOGY

## 2024-01-13 PROCEDURE — 94660 CPAP INITIATION&MGMT: CPT

## 2024-01-13 PROCEDURE — 82248 BILIRUBIN DIRECT: CPT | Performed by: STUDENT IN AN ORGANIZED HEALTH CARE EDUCATION/TRAINING PROGRAM

## 2024-01-13 PROCEDURE — 83605 ASSAY OF LACTIC ACID: CPT | Performed by: STUDENT IN AN ORGANIZED HEALTH CARE EDUCATION/TRAINING PROGRAM

## 2024-01-13 PROCEDURE — 2020000001 HC ICU ROOM DAILY

## 2024-01-13 PROCEDURE — 83735 ASSAY OF MAGNESIUM: CPT

## 2024-01-13 PROCEDURE — 2500000004 HC RX 250 GENERAL PHARMACY W/ HCPCS (ALT 636 FOR OP/ED): Performed by: STUDENT IN AN ORGANIZED HEALTH CARE EDUCATION/TRAINING PROGRAM

## 2024-01-13 PROCEDURE — 2500000005 HC RX 250 GENERAL PHARMACY W/O HCPCS: Performed by: INTERNAL MEDICINE

## 2024-01-13 PROCEDURE — 2500000002 HC RX 250 W HCPCS SELF ADMINISTERED DRUGS (ALT 637 FOR MEDICARE OP, ALT 636 FOR OP/ED): Performed by: STUDENT IN AN ORGANIZED HEALTH CARE EDUCATION/TRAINING PROGRAM

## 2024-01-13 PROCEDURE — 2500000005 HC RX 250 GENERAL PHARMACY W/O HCPCS: Performed by: STUDENT IN AN ORGANIZED HEALTH CARE EDUCATION/TRAINING PROGRAM

## 2024-01-13 PROCEDURE — 84100 ASSAY OF PHOSPHORUS: CPT | Performed by: STUDENT IN AN ORGANIZED HEALTH CARE EDUCATION/TRAINING PROGRAM

## 2024-01-13 PROCEDURE — 86140 C-REACTIVE PROTEIN: CPT | Performed by: STUDENT IN AN ORGANIZED HEALTH CARE EDUCATION/TRAINING PROGRAM

## 2024-01-13 PROCEDURE — S4991 NICOTINE PATCH NONLEGEND: HCPCS | Performed by: STUDENT IN AN ORGANIZED HEALTH CARE EDUCATION/TRAINING PROGRAM

## 2024-01-13 PROCEDURE — 85027 COMPLETE CBC AUTOMATED: CPT | Performed by: STUDENT IN AN ORGANIZED HEALTH CARE EDUCATION/TRAINING PROGRAM

## 2024-01-13 RX ORDER — PHENOBARBITAL SODIUM 65 MG/ML
130 INJECTION, SOLUTION INTRAMUSCULAR; INTRAVENOUS ONCE
Status: COMPLETED | OUTPATIENT
Start: 2024-01-13 | End: 2024-01-13

## 2024-01-13 RX ORDER — FENTANYL CITRATE 50 UG/ML
12.5 INJECTION, SOLUTION INTRAMUSCULAR; INTRAVENOUS EVERY 2 HOUR PRN
Status: DISCONTINUED | OUTPATIENT
Start: 2024-01-13 | End: 2024-01-15

## 2024-01-13 RX ORDER — SODIUM CHLORIDE, SODIUM LACTATE, POTASSIUM CHLORIDE, CALCIUM CHLORIDE 600; 310; 30; 20 MG/100ML; MG/100ML; MG/100ML; MG/100ML
1000 INJECTION, SOLUTION INTRAVENOUS CONTINUOUS
Status: ACTIVE | OUTPATIENT
Start: 2024-01-13 | End: 2024-01-13

## 2024-01-13 RX ORDER — BUPRENORPHINE HYDROCHLORIDE 0.32 MG/ML
0.3 INJECTION INTRAMUSCULAR; INTRAVENOUS EVERY 12 HOURS
Status: DISCONTINUED | OUTPATIENT
Start: 2024-01-13 | End: 2024-01-15

## 2024-01-13 RX ORDER — SODIUM CHLORIDE, SODIUM LACTATE, POTASSIUM CHLORIDE, CALCIUM CHLORIDE 600; 310; 30; 20 MG/100ML; MG/100ML; MG/100ML; MG/100ML
250 INJECTION, SOLUTION INTRAVENOUS CONTINUOUS
Status: ACTIVE | OUTPATIENT
Start: 2024-01-13 | End: 2024-01-13

## 2024-01-13 RX ADMIN — Medication: at 07:27

## 2024-01-13 RX ADMIN — LIDOCAINE 1 PATCH: 4 PATCH TOPICAL at 09:44

## 2024-01-13 RX ADMIN — DEXMEDETOMIDINE HYDROCHLORIDE 1.4 MCG/KG/HR: 400 INJECTION INTRAVENOUS at 23:55

## 2024-01-13 RX ADMIN — ASCORBIC ACID, VITAMIN A PALMITATE, CHOLECALCIFEROL, THIAMINE HYDROCHLORIDE, RIBOFLAVIN-5 PHOSPHATE SODIUM, PYRIDOXINE HYDROCHLORIDE, NIACINAMIDE, DEXPANTHENOL, ALPHA-TOCOPHEROL ACETATE, VITAMIN K1, FOLIC ACID, BIOTIN, CYANOCOBALAMIN: 200; 3300; 200; 6; 3.6; 6; 40; 15; 10; 150; 600; 60; 5 INJECTION, SOLUTION INTRAVENOUS at 20:13

## 2024-01-13 RX ADMIN — FENTANYL CITRATE 12.5 MCG: 50 INJECTION INTRAMUSCULAR; INTRAVENOUS at 23:05

## 2024-01-13 RX ADMIN — PHENOBARBITAL SODIUM 32.5 MG: 65 INJECTION INTRAMUSCULAR at 21:10

## 2024-01-13 RX ADMIN — SODIUM CHLORIDE, SODIUM LACTATE, POTASSIUM CHLORIDE, AND CALCIUM CHLORIDE 1000 ML/HR: 600; 310; 30; 20 INJECTION, SOLUTION INTRAVENOUS at 08:00

## 2024-01-13 RX ADMIN — FENTANYL CITRATE 12.5 MCG: 50 INJECTION INTRAMUSCULAR; INTRAVENOUS at 16:47

## 2024-01-13 RX ADMIN — CEFTRIAXONE SODIUM 2 G: 2 INJECTION, SOLUTION INTRAVENOUS at 09:44

## 2024-01-13 RX ADMIN — Medication 1 PATCH: at 09:44

## 2024-01-13 RX ADMIN — THIAMINE HYDROCHLORIDE 500 MG: 100 INJECTION, SOLUTION INTRAMUSCULAR; INTRAVENOUS at 09:44

## 2024-01-13 RX ADMIN — DEXMEDETOMIDINE HYDROCHLORIDE 0.58 MCG/KG/HR: 400 INJECTION INTRAVENOUS at 04:29

## 2024-01-13 RX ADMIN — SODIUM CHLORIDE, POTASSIUM CHLORIDE, SODIUM LACTATE AND CALCIUM CHLORIDE 250 ML/HR: 600; 310; 30; 20 INJECTION, SOLUTION INTRAVENOUS at 06:59

## 2024-01-13 RX ADMIN — PHENOBARBITAL SODIUM 32.5 MG: 65 INJECTION INTRAMUSCULAR at 23:37

## 2024-01-13 RX ADMIN — PHENOBARBITAL SODIUM 32.5 MG: 65 INJECTION INTRAMUSCULAR at 09:44

## 2024-01-13 RX ADMIN — PHENOBARBITAL SODIUM 130 MG: 65 INJECTION INTRAMUSCULAR at 16:30

## 2024-01-13 RX ADMIN — DEXMEDETOMIDINE HYDROCHLORIDE 0.47 MCG/KG/HR: 400 INJECTION INTRAVENOUS at 15:45

## 2024-01-13 ASSESSMENT — LIFESTYLE VARIABLES
AUDITORY DISTURBANCES: NOT PRESENT
PAROXYSMAL SWEATS: NO SWEAT VISIBLE
ANXIETY: 3
NAUSEA AND VOMITING: NO NAUSEA AND NO VOMITING
TREMOR: NO TREMOR
NAUSEA AND VOMITING: NO NAUSEA AND NO VOMITING
VISUAL DISTURBANCES: NOT PRESENT
HEADACHE, FULLNESS IN HEAD: NOT PRESENT
HEADACHE, FULLNESS IN HEAD: NOT PRESENT
AGITATION: MODERATELY FIDGETY AND RESTLESS
AUDITORY DISTURBANCES: NOT PRESENT
AUDITORY DISTURBANCES: NOT PRESENT
HEADACHE, FULLNESS IN HEAD: NOT PRESENT
ORIENTATION AND CLOUDING OF SENSORIUM: DISORIENTED FOR PLACE OR PERSON
VISUAL DISTURBANCES: NOT PRESENT
ANXIETY: 3
ORIENTATION AND CLOUDING OF SENSORIUM: DISORIENTED FOR DATE BY MORE THAN 2 CALENDAR DAYS
ORIENTATION AND CLOUDING OF SENSORIUM: DISORIENTED FOR PLACE OR PERSON
VISUAL DISTURBANCES: NOT PRESENT
AGITATION: MODERATELY FIDGETY AND RESTLESS
TOTAL SCORE: 11
TOTAL SCORE: 8
NAUSEA AND VOMITING: NO NAUSEA AND NO VOMITING
AGITATION: 3
TREMOR: NO TREMOR
TREMOR: NO TREMOR
PAROXYSMAL SWEATS: BARELY PERCEPTIBLE SWEATING, PALMS MOIST
TOTAL SCORE: 10
ANXIETY: NO ANXIETY, AT EASE
PAROXYSMAL SWEATS: NO SWEAT VISIBLE

## 2024-01-13 ASSESSMENT — COGNITIVE AND FUNCTIONAL STATUS - GENERAL
DRESSING REGULAR UPPER BODY CLOTHING: TOTAL
MOVING FROM LYING ON BACK TO SITTING ON SIDE OF FLAT BED WITH BEDRAILS: TOTAL
DRESSING REGULAR LOWER BODY CLOTHING: TOTAL
HELP NEEDED FOR BATHING: TOTAL
PERSONAL GROOMING: TOTAL
DAILY ACTIVITIY SCORE: 6
MOBILITY SCORE: 6
MOVING TO AND FROM BED TO CHAIR: TOTAL
CLIMB 3 TO 5 STEPS WITH RAILING: TOTAL
STANDING UP FROM CHAIR USING ARMS: TOTAL
TOILETING: TOTAL
EATING MEALS: TOTAL
WALKING IN HOSPITAL ROOM: TOTAL
TURNING FROM BACK TO SIDE WHILE IN FLAT BAD: TOTAL

## 2024-01-13 ASSESSMENT — PAIN - FUNCTIONAL ASSESSMENT
PAIN_FUNCTIONAL_ASSESSMENT: CPOT (CRITICAL CARE PAIN OBSERVATION TOOL)
PAIN_FUNCTIONAL_ASSESSMENT: CPOT (CRITICAL CARE PAIN OBSERVATION TOOL)

## 2024-01-13 ASSESSMENT — PAIN SCALES - GENERAL
PAINLEVEL_OUTOF10: 3
PAINLEVEL_OUTOF10: 2

## 2024-01-13 NOTE — PROGRESS NOTES
Aman Leija is a 68 y.o. male on day 4 of admission presenting with Influenza A.      Subjective   Patient seen and examined at bedside.  Patient continuing to require Precedex drip.  ICU updated CODE STATUS after conversation with son.  Objective     Last Recorded Vitals  /55   Pulse 77   Temp 35.5 °C (95.9 °F) (Temporal)   Resp 16   Wt 55.3 kg (121 lb 14.6 oz)   SpO2 92%   Intake/Output last 3 Shifts:    Intake/Output Summary (Last 24 hours) at 1/13/2024 1422  Last data filed at 1/13/2024 0600  Gross per 24 hour   Intake 269.38 ml   Output 1200 ml   Net -930.62 ml         Admission Weight  Weight: 54.4 kg (120 lb) (01/09/24 0039)    Daily Weight  01/13/24 : 55.3 kg (121 lb 14.6 oz)    Image Results  XR chest 1 view  Narrative: Interpreted By:  Pawan Wade,   STUDY:  Chest dated  1/13/2024.      INDICATION:  Signs/Symptoms:r/a pt with pneumonia      COMPARISON:  Chest dated 01/12/2024.      ACCESSION NUMBER(S):  [id number]      ORDERING CLINICIAN:  ELIZABETH GIRARD      TECHNIQUE:  One view of the chest.      FINDINGS:  There is gradient opacity the right mid to lower lung zone. There are  some reticulo nodular opacities in the aerated right upper lung zone.  Reticulonodular opacities are seen in the left lung most evident in  the mid lung zone.  No pneumothorax  is evident. The  cardiomediastinal silhouette is  not enlarged.There is a right IJ  line with the tip of the catheter projecting over the mid SVC.  Degenerative changes seen of the spine and shoulders.      Impression: 1. Opacity in the right mid to lower lung zone which may relate to  effusion with pneumonitis although other etiologies such as  bronchogenic carcinoma are not excluded and follow-up to resolution  is recommended. Correlation with sputum culture/sputum cytology may  be helpful.  2. Reticulo nodular opacities in the right upper lung zone and in the  left lung which may represent nonspecific broncho pneumonitis.  Attention on  follow-up studies is recommended.      MACRO:  None      Signed by: Pawan Wade 1/13/2024 12:24 PM  Dictation workstation:   [id number]      Physical Exam  Constitutional: Frail, cachectic, sleeping comfortably  ENMT: mucous membranes dry, conjunctivae clear  Head/Neck: normocephalic, atraumatic; supple, trachea midline  Respiratory/Thorax: patent airways, tachypneic  Cardiovascular: RRR, no murmur  Gastrointestinal: soft, nondistended, non-tender, bowel sounds appreciated  Extremities: palpable peripheral pulses, no edema or cyanosis  Neurological: Unable to assess properly due to patient being sedated with Precedex  Psychological: Unable to assess appropriately  Skin: warm and dry    Assessment/Plan   Aman Leija is a 68 y.o. male with a history of hepatitis C, polysubstance abuse who presented to [facility] complaining of shortness of breath.    1/12: Patient started on Precedex drip due to increasing agitation overnight, continue antibiotics, transitioned to oral amiodarone, wean Precedex. Fluids discontinued    1/13: Patient continues to require Precedex drip, creatinine improving.  Goals of care conversation was had by the ICU team, CODE STATUS updated to DNR DNI.  TPN today.     Acute:  #Influenza A  #Community-acquired pneumonia,suspect parapneumonic effusion  #Acute hypoxic respiratory failure requiring supplemental oxygen  #Leukocytosis likely secondary to respiratory tract infection - Improving  #Sepsis without shock, Streptococcus pneumonia bacteremia and pneumonia   -Ceftriaxone day day 3  -Tamiflu day 5/5  -WBC 13.3, will continue to monitor  -Procalcitonin 88.77  -Blood x2 cultures positive for strep pneumonia   -MRSA nares positive     #Acute alcohol withdrawal  -phenobarb PRN     #ZAMZAM-improving  #Prerenal azotemia  #Hypermagnesemia  -Patient's baseline creatinine unknown, 1.59 on presentation currently downtrending  -Will continue to monitor kidney function and  electrolytes    #Elevated LFTs, suspect secondary to alcohol abuse  -Will continue to monitor     #New onset A-fib RVR-in NSR  -Diltiazem and digoxin provided in the ED  -Continue amiodarone drip  -Continue Lopressor 25 mg twice daily     Chronic:  #History of hepatitis C  #History of polysubstance abuse  -Continue buprenorphine 8 mg twice daily  -Continue nicotine patch  -CIWA protocol  -Continue thiamine     DVT prophylaxis: Subcu heparin  Diet: NPO  O2: 3 L nasal cannula  Fluids: As needed  Drips: Precedex  Antibiotics: Rocephin day 3  CODE STATUS: Full code     Dwaine Byrd DO, PGY-1  Internal Medicine

## 2024-01-13 NOTE — SIGNIFICANT EVENT
Goals of care:    Spoke with the patient's son, Aman, we have been speaking to regularly over the last few days.  His respiratory status has not improved and he has taken almost nothing by mouth over the last several days.    Given that his progress is not improved, this combined with his underlying health status is making it very difficult for him to fight this infection despite our best efforts.  He tells me that his father would never want to live on a ventilator and would never want to live in a dependent fashion.    If he were to be put on the ventilator, I told him that it would be extremely difficult to extubate him given his behavior and pulmonary cachexia.  There is a decent chance he would end up with a tracheostomy and living in a fully dependent fashion in an LTAC.  Tells me his father would never want this.  If given the option, he would much rather be placed on a morphine infusion and passed away peacefully.    He would also not want chest compressions to prolong his life if he were to have cardiac arrest.    Will change his CODE STATUS to DNR/DNI and continue with our current resuscitation efforts.

## 2024-01-13 NOTE — PROGRESS NOTES
Subjective  A complete 10 point review of systems was completed and is otherwise negative unless stated.       Physical exam  Physical Exam:   General appearance: Altered, cachectic, poorly redirectable, fidgety and anxious  HEENT: Edentulous  Neck: no obvious deformity, JVP WNL  Respiratory: Use of accessory muscles including scalenes intercostals, appropriate respiratory effort  Cardiovascular: Irregular tachycardia  Abdomen: no organomegaly, no tenderness to palpation in all quadrants  Extremities: Frail musculature  Skin: intact, no rashes   Neurologic: Tangential thoughts, fidgety and anxious, mildly tremulous, redirectable however at times uncooperative  Psych: Difficult to assess     Remainder of objective data including imaging and laboratory findings were all reviewed.     Assessment and plan:  Brief admission history:  68-year-old with history of polysubstance abuse, hepatitis C.  Presented to hospital short of breath and feeling lethargic.  Elevated liver enzymes, low glucose, possible ZAMZAM.  Also significant pneumonia with possible parapneumonic effusion.  In atrial fibrillation with rapid ventricular rate, on oxygen.  Admitted to ICU in setting of multiorgan dysfunction.    Daily progress:  1/13: Patient more awake this morning after receiving benzodiazepines yesterday and sleeping throughout the entire day, creatinine stable however BUN has risen, given his poor oral intake over the last few days despite he is extremely dry clinically, will give 2 L lactated Ringer's and start the patient on TPN via central line as he is hardly eating anything, remains on dexmedetomidine and phenobarbital, switch buprenorphine to IV, pleural effusion too small to tap, continue ceftriaxone, prognosis guarded, family would like to change his CODE STATUS to DNR as per clinical event note     Neurologic:  # Acute toxic encephalopathy  # Substance abuse  # EtOH withdrawal, suspect  Continue buprenorphine IV  EtOH withdrawal,  phenobarbital protocol  Dexmedetomidine as needed  Avoid benzodiazepines as patient had an adverse reaction    Cardiovascular:  # Atrial fibrillation with rapid ventricular rate, paroxysmal  Metoprolol 25 mg twice daily, p.o. amiodarone  For now, hold anticoagulation while in sinus    Respiratory:  # Acute hypoxic respiratory failure  # Tobacco abuse  # Multifocal pneumonia with possible parapneumonic effusion  # Influenza A  Respiratory support with high flow oxygen alternating with noninvasive  Pan sensitive Streptococcus on blood culture, ceftriaxone or similar for 14 days  Continue oseltamavir  Pleural effusion too small to tap     Gastrointestinal:  # Hepatitis C  # Alcohol hepatitis, possible  Hold off on steroid for now     Renal:  # Possible ZAMZAM  # HAGMA  # Lactic acidosis  # Prerenal azotemia  Lactated Ringer's as needed  Hemodynamic ZAMZAM, creatinine stable, BUN elevated; improving  Hold steroids as uremic encephalopathy will be a difficult condition to correct in this patient     Hematologic/oncologic:  # Leukopenia  Insetting of infection     Infectious disease:  # Community-acquired pneumonia with likely parapneumonic effusion  # Bloodstream infection, Streptococcus pneumonia  Strep pneumonia, antigen positive as well as blood cultures  Pansensitive, 2g CTX for now   Unlikely to be amenable to PICC line or rehab  Of note, MRSA nares positive however given clinical improvement we will not treat this    Endocrine:  No issues     Tubes:-Airvo  Lines: Peripheral IVs, right IJ  Devices: None  Fluids: As needed  Antibiotics: Ceftriaxone  Prophylaxis: Heparin  CODE STATUS: Full     This is a prelim note, please await attending attestation.     Seth Echevarria, DO     This note was entered with the assistance of dictation software, please excuse any grammatical errors or brevity.

## 2024-01-13 NOTE — CARE PLAN
Problem: Daily Care  Goal: Daily care needs are met  Outcome: Progressing     Problem: Skin  Goal: Prevent/manage excess moisture  Flowsheets (Taken 1/13/2024 0542)  Prevent/manage excess moisture: Monitor for/manage infection if present     Problem: Skin  Goal: Prevent/minimize sheer/friction injuries  Flowsheets (Taken 1/13/2024 0542)  Prevent/minimize sheer/friction injuries: Use pull sheet     Problem: Skin  Goal: Promote skin healing  Flowsheets (Taken 1/13/2024 0542)  Promote skin healing: Turn/reposition every 2 hours/use positioning/transfer devices   The patient's goals for the shift include      The clinical goals for the shift include Maintain SpO2 of 88% or greater.  Remains on CPAP and stayed on an FiO2 of 60% overnight.

## 2024-01-14 ENCOUNTER — APPOINTMENT (OUTPATIENT)
Dept: RADIOLOGY | Facility: HOSPITAL | Age: 69
DRG: 871 | End: 2024-01-14
Payer: COMMERCIAL

## 2024-01-14 LAB
ALBUMIN SERPL BCP-MCNC: 1.5 G/DL (ref 3.4–5)
ALP SERPL-CCNC: 851 U/L (ref 33–136)
ALT SERPL W P-5'-P-CCNC: 53 U/L (ref 10–52)
ANION GAP BLDA CALCULATED.4IONS-SCNC: 2 MMO/L (ref 10–25)
ANION GAP SERPL CALC-SCNC: 8 MMOL/L (ref 10–20)
ARTERIAL PATENCY WRIST A: POSITIVE
AST SERPL W P-5'-P-CCNC: 146 U/L (ref 9–39)
BASE EXCESS BLDA CALC-SCNC: 6.6 MMOL/L (ref -2–3)
BASOPHILS # BLD MANUAL: 0 X10*3/UL (ref 0–0.1)
BASOPHILS NFR BLD MANUAL: 0 %
BILIRUB DIRECT SERPL-MCNC: 0.5 MG/DL (ref 0–0.3)
BILIRUB SERPL-MCNC: 1 MG/DL (ref 0–1.2)
BODY TEMPERATURE: 37 DEGREES CELSIUS
BUN SERPL-MCNC: 83 MG/DL (ref 6–23)
BURR CELLS BLD QL SMEAR: ABNORMAL
CA-I BLDA-SCNC: 1.08 MMOL/L (ref 1.1–1.33)
CALCIUM SERPL-MCNC: 6.4 MG/DL (ref 8.6–10.3)
CHLORIDE BLDA-SCNC: 114 MMOL/L (ref 98–107)
CHLORIDE SERPL-SCNC: 110 MMOL/L (ref 98–107)
CO2 SERPL-SCNC: 32 MMOL/L (ref 21–32)
CREAT SERPL-MCNC: 0.9 MG/DL (ref 0.5–1.3)
CRP SERPL-MCNC: 15.72 MG/DL
EGFRCR SERPLBLD CKD-EPI 2021: >90 ML/MIN/1.73M*2
EOSINOPHIL # BLD MANUAL: 0 X10*3/UL (ref 0–0.7)
EOSINOPHIL NFR BLD MANUAL: 0 %
EPAP CMH2O: 10 CM H2O
ERYTHROCYTE [DISTWIDTH] IN BLOOD BY AUTOMATED COUNT: 14.1 % (ref 11.5–14.5)
FIBRINOGEN PPP-MCNC: 523 MG/DL (ref 200–400)
FREQUENCY (BPM): 37 BPM
GLUCOSE BLDA-MCNC: 175 MG/DL (ref 74–99)
GLUCOSE SERPL-MCNC: 177 MG/DL (ref 74–99)
HCO3 BLDA-SCNC: 34.2 MMOL/L (ref 22–26)
HCT VFR BLD AUTO: 30.7 % (ref 41–52)
HCT VFR BLD EST: 38 % (ref 41–52)
HGB BLD-MCNC: 10.5 G/DL (ref 13.5–17.5)
HGB BLDA-MCNC: 12.8 G/DL (ref 13.5–17.5)
HOLD SPECIMEN: NORMAL
IMM GRANULOCYTES # BLD AUTO: 0.14 X10*3/UL (ref 0–0.7)
IMM GRANULOCYTES NFR BLD AUTO: 1.9 % (ref 0–0.9)
INHALED O2 CONCENTRATION: 100 %
IPAP CMH2O: 16 CM H2O
LACTATE BLDA-SCNC: 1.9 MMOL/L (ref 0.4–2)
LYMPHOCYTES # BLD MANUAL: 0.22 X10*3/UL (ref 1.2–4.8)
LYMPHOCYTES NFR BLD MANUAL: 3 %
MAGNESIUM SERPL-MCNC: 3.29 MG/DL (ref 1.6–2.4)
MCH RBC QN AUTO: 31.6 PG (ref 26–34)
MCHC RBC AUTO-ENTMCNC: 34.2 G/DL (ref 32–36)
MCV RBC AUTO: 93 FL (ref 80–100)
METAMYELOCYTES # BLD MANUAL: 0.15 X10*3/UL
METAMYELOCYTES NFR BLD MANUAL: 2 %
MONOCYTES # BLD MANUAL: 0 X10*3/UL (ref 0.1–1)
MONOCYTES NFR BLD MANUAL: 0 %
NEUTROPHILS # BLD MANUAL: 6.86 X10*3/UL (ref 1.2–7.7)
NEUTS BAND # BLD MANUAL: 0.29 X10*3/UL (ref 0–0.7)
NEUTS BAND NFR BLD MANUAL: 4 %
NEUTS SEG # BLD MANUAL: 6.57 X10*3/UL (ref 1.2–7)
NEUTS SEG NFR BLD MANUAL: 90 %
NRBC BLD-RTO: 1.4 /100 WBCS (ref 0–0)
OVALOCYTES BLD QL SMEAR: ABNORMAL
OXYHGB MFR BLDA: 93.9 % (ref 94–98)
PCO2 BLDA: 62 MM HG (ref 38–42)
PH BLDA: 7.35 PH (ref 7.38–7.42)
PHOSPHATE SERPL-MCNC: 4.4 MG/DL (ref 2.5–4.9)
PLATELET # BLD AUTO: 16 X10*3/UL (ref 150–450)
PO2 BLDA: 73 MM HG (ref 85–95)
POTASSIUM BLDA-SCNC: 4.6 MMOL/L (ref 3.5–5.3)
POTASSIUM SERPL-SCNC: 4.5 MMOL/L (ref 3.5–5.3)
PROT SERPL-MCNC: 4.4 G/DL (ref 6.4–8.2)
RBC # BLD AUTO: 3.32 X10*6/UL (ref 4.5–5.9)
RBC MORPH BLD: ABNORMAL
SAO2 % BLDA: 96 % (ref 94–100)
SODIUM BLDA-SCNC: 146 MMOL/L (ref 136–145)
SODIUM SERPL-SCNC: 145 MMOL/L (ref 136–145)
SPECIMEN DRAWN FROM PATIENT: ABNORMAL
SPONTANEOUS TIDAL VOLUME: 439 ML
TARGETS BLD QL SMEAR: ABNORMAL
TOTAL CELLS COUNTED BLD: 100
TOTAL MINUTE VOLUME: 22.6 LITER
VARIANT LYMPHS # BLD MANUAL: 0.07 X10*3/UL (ref 0–0.5)
VARIANT LYMPHS NFR BLD: 1 %
VENTILATOR MODE: ABNORMAL
VENTILATOR RATE: 16 BPM
WBC # BLD AUTO: 7.3 X10*3/UL (ref 4.4–11.3)

## 2024-01-14 PROCEDURE — 37799 UNLISTED PX VASCULAR SURGERY: CPT | Performed by: STUDENT IN AN ORGANIZED HEALTH CARE EDUCATION/TRAINING PROGRAM

## 2024-01-14 PROCEDURE — 94660 CPAP INITIATION&MGMT: CPT

## 2024-01-14 PROCEDURE — 86140 C-REACTIVE PROTEIN: CPT | Performed by: STUDENT IN AN ORGANIZED HEALTH CARE EDUCATION/TRAINING PROGRAM

## 2024-01-14 PROCEDURE — 85362 FIBRIN DEGRADATION PRODUCTS: CPT | Performed by: INTERNAL MEDICINE

## 2024-01-14 PROCEDURE — 82248 BILIRUBIN DIRECT: CPT | Performed by: STUDENT IN AN ORGANIZED HEALTH CARE EDUCATION/TRAINING PROGRAM

## 2024-01-14 PROCEDURE — 2500000004 HC RX 250 GENERAL PHARMACY W/ HCPCS (ALT 636 FOR OP/ED): Performed by: STUDENT IN AN ORGANIZED HEALTH CARE EDUCATION/TRAINING PROGRAM

## 2024-01-14 PROCEDURE — S4991 NICOTINE PATCH NONLEGEND: HCPCS | Performed by: STUDENT IN AN ORGANIZED HEALTH CARE EDUCATION/TRAINING PROGRAM

## 2024-01-14 PROCEDURE — 84145 PROCALCITONIN (PCT): CPT | Mod: PARLAB | Performed by: STUDENT IN AN ORGANIZED HEALTH CARE EDUCATION/TRAINING PROGRAM

## 2024-01-14 PROCEDURE — 2020000001 HC ICU ROOM DAILY

## 2024-01-14 PROCEDURE — 85027 COMPLETE CBC AUTOMATED: CPT | Performed by: STUDENT IN AN ORGANIZED HEALTH CARE EDUCATION/TRAINING PROGRAM

## 2024-01-14 PROCEDURE — 70450 CT HEAD/BRAIN W/O DYE: CPT | Performed by: STUDENT IN AN ORGANIZED HEALTH CARE EDUCATION/TRAINING PROGRAM

## 2024-01-14 PROCEDURE — 85007 BL SMEAR W/DIFF WBC COUNT: CPT | Performed by: STUDENT IN AN ORGANIZED HEALTH CARE EDUCATION/TRAINING PROGRAM

## 2024-01-14 PROCEDURE — 80053 COMPREHEN METABOLIC PANEL: CPT

## 2024-01-14 PROCEDURE — 2500000002 HC RX 250 W HCPCS SELF ADMINISTERED DRUGS (ALT 637 FOR MEDICARE OP, ALT 636 FOR OP/ED): Performed by: STUDENT IN AN ORGANIZED HEALTH CARE EDUCATION/TRAINING PROGRAM

## 2024-01-14 PROCEDURE — 84132 ASSAY OF SERUM POTASSIUM: CPT | Performed by: INTERNAL MEDICINE

## 2024-01-14 PROCEDURE — 83735 ASSAY OF MAGNESIUM: CPT

## 2024-01-14 PROCEDURE — 36600 WITHDRAWAL OF ARTERIAL BLOOD: CPT

## 2024-01-14 PROCEDURE — 2500000004 HC RX 250 GENERAL PHARMACY W/ HCPCS (ALT 636 FOR OP/ED): Performed by: INTERNAL MEDICINE

## 2024-01-14 PROCEDURE — 85384 FIBRINOGEN ACTIVITY: CPT | Performed by: INTERNAL MEDICINE

## 2024-01-14 PROCEDURE — 84100 ASSAY OF PHOSPHORUS: CPT

## 2024-01-14 PROCEDURE — 2500000005 HC RX 250 GENERAL PHARMACY W/O HCPCS: Performed by: STUDENT IN AN ORGANIZED HEALTH CARE EDUCATION/TRAINING PROGRAM

## 2024-01-14 PROCEDURE — 70450 CT HEAD/BRAIN W/O DYE: CPT

## 2024-01-14 PROCEDURE — 37799 UNLISTED PX VASCULAR SURGERY: CPT | Performed by: INTERNAL MEDICINE

## 2024-01-14 RX ORDER — SODIUM CHLORIDE, SODIUM LACTATE, POTASSIUM CHLORIDE, CALCIUM CHLORIDE 600; 310; 30; 20 MG/100ML; MG/100ML; MG/100ML; MG/100ML
500 INJECTION, SOLUTION INTRAVENOUS ONCE
Status: COMPLETED | OUTPATIENT
Start: 2024-01-14 | End: 2024-01-14

## 2024-01-14 RX ORDER — KETAMINE HCL IN NACL, ISO-OSM 100MG/10ML
1 SYRINGE (ML) INJECTION ONCE
Status: COMPLETED | OUTPATIENT
Start: 2024-01-14 | End: 2024-01-14

## 2024-01-14 RX ADMIN — LIDOCAINE 1 PATCH: 4 PATCH TOPICAL at 11:10

## 2024-01-14 RX ADMIN — THIAMINE HYDROCHLORIDE 500 MG: 100 INJECTION, SOLUTION INTRAMUSCULAR; INTRAVENOUS at 11:10

## 2024-01-14 RX ADMIN — CEFTRIAXONE SODIUM 2 G: 2 INJECTION, SOLUTION INTRAVENOUS at 11:10

## 2024-01-14 RX ADMIN — Medication 1 PATCH: at 11:09

## 2024-01-14 RX ADMIN — PHENOBARBITAL SODIUM 32.5 MG: 65 INJECTION INTRAMUSCULAR at 04:36

## 2024-01-14 RX ADMIN — Medication 58 MG: at 11:09

## 2024-01-14 RX ADMIN — Medication 0.05 MG/KG/HR: at 14:14

## 2024-01-14 RX ADMIN — SODIUM CHLORIDE, POTASSIUM CHLORIDE, SODIUM LACTATE AND CALCIUM CHLORIDE 500 ML: 600; 310; 30; 20 INJECTION, SOLUTION INTRAVENOUS at 03:46

## 2024-01-14 RX ADMIN — DEXMEDETOMIDINE HYDROCHLORIDE 0.91 MCG/KG/HR: 400 INJECTION INTRAVENOUS at 04:54

## 2024-01-14 RX ADMIN — Medication 3 MG/KG/HR: at 22:02

## 2024-01-14 RX ADMIN — DEXMEDETOMIDINE HYDROCHLORIDE 1 MCG/KG/HR: 400 INJECTION INTRAVENOUS at 20:39

## 2024-01-14 RX ADMIN — PHENOBARBITAL SODIUM 32.5 MG: 65 INJECTION INTRAMUSCULAR at 21:52

## 2024-01-14 RX ADMIN — DEXMEDETOMIDINE HYDROCHLORIDE 1 MCG/KG/HR: 400 INJECTION INTRAVENOUS at 13:58

## 2024-01-14 RX ADMIN — PHENOBARBITAL SODIUM 32.5 MG: 65 INJECTION INTRAMUSCULAR at 06:56

## 2024-01-14 ASSESSMENT — PAIN - FUNCTIONAL ASSESSMENT
PAIN_FUNCTIONAL_ASSESSMENT: CPOT (CRITICAL CARE PAIN OBSERVATION TOOL)

## 2024-01-14 ASSESSMENT — LIFESTYLE VARIABLES
AGITATION: MODERATELY FIDGETY AND RESTLESS
VISUAL DISTURBANCES: NOT PRESENT
ORIENTATION AND CLOUDING OF SENSORIUM: DISORIENTED FOR PLACE OR PERSON
AGITATION: MODERATELY FIDGETY AND RESTLESS
PAROXYSMAL SWEATS: 2
TREMOR: NO TREMOR
VISUAL DISTURBANCES: NOT PRESENT
TREMOR: NO TREMOR
HEADACHE, FULLNESS IN HEAD: NOT PRESENT
AUDITORY DISTURBANCES: NOT PRESENT
NAUSEA AND VOMITING: NO NAUSEA AND NO VOMITING
ORIENTATION AND CLOUDING OF SENSORIUM: DISORIENTED FOR PLACE OR PERSON
TOTAL SCORE: 14
ANXIETY: MODERATELY ANXIOUS, OR GUARDED, SO ANXIETY IS INFERRED
NAUSEA AND VOMITING: NO NAUSEA AND NO VOMITING
TOTAL SCORE: 12
PAROXYSMAL SWEATS: NO SWEAT VISIBLE
ANXIETY: MODERATELY ANXIOUS, OR GUARDED, SO ANXIETY IS INFERRED
AUDITORY DISTURBANCES: NOT PRESENT
HEADACHE, FULLNESS IN HEAD: NOT PRESENT

## 2024-01-14 ASSESSMENT — PAIN SCALES - GENERAL
PAINLEVEL_OUTOF10: 0 - NO PAIN
PAINLEVEL_OUTOF10: 0 - NO PAIN
PAINLEVEL_OUTOF10: 5 - MODERATE PAIN
PAINLEVEL_OUTOF10: 0 - NO PAIN

## 2024-01-14 ASSESSMENT — PAIN SCALES - WONG BAKER: WONGBAKER_NUMERICALRESPONSE: NO HURT

## 2024-01-14 NOTE — PROGRESS NOTES
Aman Leija is a 68 y.o. male on day 5 of admission presenting with Influenza A.      Subjective   Patient seen and examined at bedside.  No acute changes overnight  Objective     Last Recorded Vitals  BP 98/58   Pulse 66   Temp 36.2 °C (97.2 °F) (Temporal)   Resp 17   Wt 57.8 kg (127 lb 6.8 oz)   SpO2 100%   Intake/Output last 3 Shifts:    Intake/Output Summary (Last 24 hours) at 1/14/2024 1308  Last data filed at 1/14/2024 0545  Gross per 24 hour   Intake 750.48 ml   Output 2700 ml   Net -1949.52 ml         Admission Weight  Weight: 54.4 kg (120 lb) (01/09/24 0039)    Daily Weight  01/14/24 : 57.8 kg (127 lb 6.8 oz)    Image Results  XR chest 1 view  Narrative: Interpreted By:  Pawan Wade,   STUDY:  Chest dated  1/13/2024.      INDICATION:  Signs/Symptoms:r/a pt with pneumonia      COMPARISON:  Chest dated 01/12/2024.      ACCESSION NUMBER(S):  [id number]      ORDERING CLINICIAN:  ELIZABETH GIRARD      TECHNIQUE:  One view of the chest.      FINDINGS:  There is gradient opacity the right mid to lower lung zone. There are  some reticulo nodular opacities in the aerated right upper lung zone.  Reticulonodular opacities are seen in the left lung most evident in  the mid lung zone.  No pneumothorax  is evident. The  cardiomediastinal silhouette is  not enlarged.There is a right IJ  line with the tip of the catheter projecting over the mid SVC.  Degenerative changes seen of the spine and shoulders.      Impression: 1. Opacity in the right mid to lower lung zone which may relate to  effusion with pneumonitis although other etiologies such as  bronchogenic carcinoma are not excluded and follow-up to resolution  is recommended. Correlation with sputum culture/sputum cytology may  be helpful.  2. Reticulo nodular opacities in the right upper lung zone and in the  left lung which may represent nonspecific broncho pneumonitis.  Attention on follow-up studies is recommended.      MACRO:  None      Signed by:  Pawan Wade 1/13/2024 12:24 PM  Dictation workstation:   [id number]      Physical Exam  Constitutional: Frail, cachectic, sleeping comfortably  ENMT: mucous membranes dry, conjunctivae clear  Head/Neck: normocephalic, atraumatic; supple, trachea midline  Respiratory/Thorax: patent airways, tachypneic  Cardiovascular: RRR, no murmur  Gastrointestinal: soft, nondistended, non-tender, bowel sounds appreciated  Extremities: palpable peripheral pulses, no edema or cyanosis  Neurological: Unable to assess properly due to patient being sedated with Precedex  Psychological: Unable to assess appropriately  Skin: warm and dry    Assessment/Plan   Aman Leija is a 68 y.o. male with a history of hepatitis C, polysubstance abuse who presented to [facility] complaining of shortness of breath.    1/12: Patient started on Precedex drip due to increasing agitation overnight, continue antibiotics, transitioned to oral amiodarone, wean Precedex. Fluids discontinued    1/13: Patient continues to require Precedex drip, creatinine improving.  Goals of care conversation was had by the ICU team, CODE STATUS updated to DNR DNI.  TPN today.    1/14: Patient continues to be delirious and agitated, requiring Precedex.  Patient on Airvo.  Will consider switching Precedex to ketamine if patient continues to be agitated/delirious.    Acute:  #Influenza A  #Community-acquired pneumonia,suspect parapneumonic effusion  #Acute hypoxic respiratory failure requiring supplemental oxygen  #Sepsis without shock, Streptococcus pneumonia bacteremia and pneumonia   #Leukocytosis likely secondary to respiratory tract infection - Resolved  -Ceftriaxone day day 4  -WBC 13.3, will continue to monitor  -Procalcitonin 88.77  -Blood x2 cultures positive for strep pneumonia   -MRSA nares positive     #Acute alcohol withdrawal  -phenobarb PRN     #ZAMZAM-improving  #Prerenal azotemia  #Hypermagnesemia  -Patient's baseline  creatinine unknown, 1.59 on presentation currently downtrending  -Will continue to monitor kidney function and electrolytes    #Elevated LFTs, suspect secondary to alcohol abuse  -Will continue to monitor     #New onset A-fib RVR-in NSR  -Diltiazem and digoxin provided in the ED  -Continue amiodarone drip  -Continue Lopressor 25 mg twice daily     Chronic:  #History of hepatitis C  #History of polysubstance abuse  -Continue buprenorphine 8 mg twice daily  -Continue nicotine patch  -CIWA protocol  -Continue thiamine     DVT prophylaxis: Subcu heparin  Diet: NPO  O2: Airvo  Fluids: As needed  Drips: Precedex  Antibiotics: Rocephin day 4  CODE STATUS: DNR/DNI     Dwaine Byrd DO, PGY-1  Internal Medicine

## 2024-01-14 NOTE — CARE PLAN
Problem: Pain  Goal: My pain/discomfort is manageable  Outcome: Progressing     Problem: Safety  Goal: Patient will be injury free during hospitalization  Outcome: Progressing  Goal: I will remain free of falls  Outcome: Progressing     Problem: Daily Care  Goal: Daily care needs are met  Outcome: Progressing     Problem: Psychosocial Needs  Goal: Demonstrates ability to cope with hospitalization/illness  Outcome: Progressing  Goal: Collaborate with me, my family, and caregiver to identify my specific goals  Outcome: Progressing     Problem: Discharge Barriers  Goal: My discharge needs are met  Outcome: Progressing     Problem: Safety - Medical Restraint  Goal: Remains free of injury from restraints (Restraint for Interference with Medical Device)  Outcome: Progressing  Goal: Free from restraint(s) (Restraint for Interference with Medical Device)  Outcome: Progressing     Problem: Pain - Adult  Goal: Verbalizes/displays adequate comfort level or baseline comfort level  Outcome: Progressing     Problem: Safety - Adult  Goal: Free from fall injury  Outcome: Progressing     Problem: Discharge Planning  Goal: Discharge to home or other facility with appropriate resources  Outcome: Progressing     Problem: Chronic Conditions and Co-morbidities  Goal: Patient's chronic conditions and co-morbidity symptoms are monitored and maintained or improved  Outcome: Progressing     Problem: Skin  Goal: Decreased wound size/increased tissue granulation at next dressing change  Outcome: Progressing  Goal: Participates in plan/prevention/treatment measures  Outcome: Progressing  Goal: Prevent/manage excess moisture  Outcome: Progressing  Goal: Prevent/minimize sheer/friction injuries  Outcome: Progressing  Goal: Promote/optimize nutrition  Outcome: Progressing  Goal: Promote skin healing  Outcome: Progressing     Problem: Pain  Goal: Takes deep breaths with improved pain control throughout the shift  Outcome: Progressing  Goal: Turns  in bed with improved pain control throughout the shift  Outcome: Progressing  Goal: Walks with improved pain control throughout the shift  Outcome: Progressing  Goal: Performs ADL's with improved pain control throughout shift  Outcome: Progressing  Goal: Participates in PT with improved pain control throughout the shift  Outcome: Progressing  Goal: Free from opioid side effects throughout the shift  Outcome: Progressing  Goal: Free from acute confusion related to pain meds throughout the shift  Outcome: Progressing   The patient's goals for the shift include      The clinical goals for the shift include Maintain SpO2 of 88% or greater    Over the shift, the patient did not make progress toward the following goals. Barriers to progression include Patients current medical condition. Recommendations to address these barriers include Continue to educate and reinforce plan of care with patient and family.

## 2024-01-14 NOTE — PROGRESS NOTES
Subjective  A complete 10 point review of systems was completed and is otherwise negative unless stated.       Physical exam  Physical Exam:   General appearance: Altered, cachectic, poorly redirectable, fidgety and anxious  HEENT: Edentulous  Neck: no obvious deformity, JVP WNL  Respiratory: Use of accessory muscles including scalenes intercostals, appropriate respiratory effort  Cardiovascular: Irregular tachycardia  Abdomen: no organomegaly, no tenderness to palpation in all quadrants  Extremities: Frail musculature  Skin: intact, no rashes   Neurologic: Tangential thoughts, fidgety and anxious, mildly tremulous, redirectable however at times uncooperative  Psych: Difficult to assess     Remainder of objective data including imaging and laboratory findings were all reviewed.     Assessment and plan:  Brief admission history:  68-year-old with history of polysubstance abuse, hepatitis C.  Presented to hospital short of breath and feeling lethargic.  Elevated liver enzymes, low glucose, possible ZAMZAM.  Also significant pneumonia with possible parapneumonic effusion.  In atrial fibrillation with rapid ventricular rate, on oxygen.  Admitted to ICU in setting of multiorgan dysfunction.    Daily progress:  1/14: Patient remains on dexmedetomidine overnight at very high doses, given as needed fentanyl and phenobarbital, urine output and renal function have improved significantly, inflammatory markers are improving, technically he is likely outside the window for severe alcohol or opiate withdrawal however continues to be extremely delirious and agitated, on Airvo this morning with saturations in the mid 90s however he does desaturate intermittently, consider switching dexmedetomidine to ketamine if he remains difficult and uncooperative or is requiring any more dexmedetomidine he currently is, prognosis very guarded, remains DNR/DNI, if he fails to progress in the next several days will likely need to engage the  palliative care service    Neurologic:  # Acute toxic encephalopathy  # Substance abuse  # EtOH withdrawal, suspect  As needed fentanyl, patient was not taking buprenorphine as he is n.p.o.  EtOH withdrawal, phenobarbital protocol  Dexmedetomidine as needed  Avoid benzodiazepines as patient had an adverse reaction    Cardiovascular:  # Atrial fibrillation with rapid ventricular rate, paroxysmal  Converted with amiodarone infusion  Metoprolol 25 mg twice daily, p.o. amiodarone (cannot take p.o. right now)  Hold anticoagulation while in sinus especially given thrombocytopenia    Respiratory:  # Acute hypoxic respiratory failure  # Tobacco abuse  # Multifocal pneumonia with possible parapneumonic effusion  # Influenza A  Respiratory support with high flow oxygen alternating with noninvasive  Pan sensitive Streptococcus on blood culture, ceftriaxone or similar for 14 days  Continue oseltamavir     Gastrointestinal:  # Hepatitis C  # Alcohol hepatitis, possible  Hold off on steroid for now     Renal:  # Possible ZAMZAM  # HAGMA  # Lactic acidosis  # Prerenal azotemia  Lactated Ringer's as needed  Hemodynamic ZAMZAM, creatinine stable, BUN elevated; improving     Hematologic/oncologic:  # Leukopenia  # Thrombocytopenia  Insetting of infection  Transfuse platelets less than 10     Infectious disease:  # Community-acquired pneumonia with likely parapneumonic effusion  # Bloodstream infection, Streptococcus pneumonia  Strep pneumonia, antigen positive as well as blood cultures  Pansensitive, 2g CTX for now   Unlikely to be amenable to PICC line or rehab  Of note, MRSA nares positive however given clinical improvement we will not treat this    Endocrine:  No issues     Tubes:-Airvo  Lines: Peripheral IVs, right IJ  Devices: None  Fluids: As needed  Antibiotics: Ceftriaxone  Prophylaxis: Heparin  CODE STATUS: Full     This is a prelim note, please await attending attestation.     Seth Echevarria, DO     This note was entered with the  assistance of dictation software, please excuse any grammatical errors or brevity.

## 2024-01-15 VITALS
TEMPERATURE: 97.5 F | HEIGHT: 72 IN | OXYGEN SATURATION: 83 % | HEART RATE: 98 BPM | RESPIRATION RATE: 20 BRPM | DIASTOLIC BLOOD PRESSURE: 59 MMHG | SYSTOLIC BLOOD PRESSURE: 105 MMHG | BODY MASS INDEX: 17.26 KG/M2 | WEIGHT: 127.43 LBS

## 2024-01-15 LAB — PROCALCITONIN SERPL-MCNC: 6.87 NG/ML

## 2024-01-15 PROCEDURE — 2500000004 HC RX 250 GENERAL PHARMACY W/ HCPCS (ALT 636 FOR OP/ED): Mod: JZ | Performed by: STUDENT IN AN ORGANIZED HEALTH CARE EDUCATION/TRAINING PROGRAM

## 2024-01-15 PROCEDURE — 99291 CRITICAL CARE FIRST HOUR: CPT | Performed by: INTERNAL MEDICINE

## 2024-01-15 PROCEDURE — 2500000004 HC RX 250 GENERAL PHARMACY W/ HCPCS (ALT 636 FOR OP/ED): Performed by: INTERNAL MEDICINE

## 2024-01-15 RX ORDER — LORAZEPAM 2 MG/ML
2 INJECTION INTRAMUSCULAR EVERY 4 HOURS PRN
Status: DISCONTINUED | OUTPATIENT
Start: 2024-01-15 | End: 2024-01-15 | Stop reason: HOSPADM

## 2024-01-15 RX ORDER — MORPHINE SULFATE 2 MG/ML
2 INJECTION, SOLUTION INTRAMUSCULAR; INTRAVENOUS
Status: DISCONTINUED | OUTPATIENT
Start: 2024-01-15 | End: 2024-01-15 | Stop reason: HOSPADM

## 2024-01-15 RX ORDER — MORPHINE SULFATE 4 MG/ML
4 INJECTION, SOLUTION INTRAMUSCULAR; INTRAVENOUS
Status: DISCONTINUED | OUTPATIENT
Start: 2024-01-15 | End: 2024-01-15 | Stop reason: HOSPADM

## 2024-01-15 RX ORDER — MORPHINE SULFATE IN 0.9 % NACL 30 MG/30ML
0-10 PATIENT CONTROLLED ANALGESIA SYRINGE INTRAVENOUS CONTINUOUS
Status: DISCONTINUED | OUTPATIENT
Start: 2024-01-15 | End: 2024-01-15

## 2024-01-15 RX ORDER — MORPHINE SULFATE/0.9% NACL/PF 1 MG/ML
0-10 PLASTIC BAG, INJECTION (ML) INTRAVENOUS CONTINUOUS
Status: DISCONTINUED | OUTPATIENT
Start: 2024-01-15 | End: 2024-01-15 | Stop reason: HOSPADM

## 2024-01-15 RX ORDER — MORPHINE SULFATE 2 MG/ML
2 INJECTION, SOLUTION INTRAMUSCULAR; INTRAVENOUS ONCE
Status: COMPLETED | OUTPATIENT
Start: 2024-01-15 | End: 2024-01-15

## 2024-01-15 RX ORDER — LORAZEPAM 0.5 MG/1
0.5 TABLET ORAL EVERY 4 HOURS PRN
Status: DISCONTINUED | OUTPATIENT
Start: 2024-01-15 | End: 2024-01-15 | Stop reason: HOSPADM

## 2024-01-15 RX ORDER — GLYCOPYRROLATE 0.2 MG/ML
0.2 INJECTION INTRAMUSCULAR; INTRAVENOUS EVERY 4 HOURS PRN
Status: DISCONTINUED | OUTPATIENT
Start: 2024-01-15 | End: 2024-01-15 | Stop reason: HOSPADM

## 2024-01-15 RX ADMIN — LORAZEPAM 2 MG: 2 INJECTION INTRAMUSCULAR; INTRAVENOUS at 12:37

## 2024-01-15 RX ADMIN — LORAZEPAM 2 MG: 2 INJECTION INTRAMUSCULAR; INTRAVENOUS at 01:25

## 2024-01-15 RX ADMIN — PHENOBARBITAL SODIUM 32.5 MG: 65 INJECTION INTRAMUSCULAR at 13:51

## 2024-01-15 RX ADMIN — Medication 6 MG/HR: at 08:17

## 2024-01-15 RX ADMIN — Medication 2 MG/HR: at 00:30

## 2024-01-15 RX ADMIN — MORPHINE SULFATE 2 MG: 2 INJECTION, SOLUTION INTRAMUSCULAR; INTRAVENOUS at 00:51

## 2024-01-15 RX ADMIN — Medication 8 MG/HR: at 13:55

## 2024-01-15 RX ADMIN — PHENOBARBITAL SODIUM 32.5 MG: 65 INJECTION INTRAMUSCULAR at 04:23

## 2024-01-15 ASSESSMENT — PAIN - FUNCTIONAL ASSESSMENT: PAIN_FUNCTIONAL_ASSESSMENT: CPOT (CRITICAL CARE PAIN OBSERVATION TOOL)

## 2024-01-15 ASSESSMENT — PAIN SCALES - WONG BAKER: WONGBAKER_NUMERICALRESPONSE: HURTS WHOLE LOT

## 2024-01-15 NOTE — DISCHARGE SUMMARY
Discharge Diagnosis  Influenza A    Issues Requiring Follow-Up  N/A    Discharge Meds     Your medication list        ASK your doctor about these medications        Instructions Last Dose Given Next Dose Due   Zubsolv 8.6-2.1 mg SL tablet  Generic drug: buprenorphine-naloxone                    Test Results Pending At Discharge  Pending Labs       Order Current Status    Drug Screen, Urine Collected (24 0628)    Extra Urine Gray Tube Collected (24 0344)    Fibrin split products In process    Urinalysis with Reflex Culture and Microscopic In process            Hospital Course   Aman Leija is a 68-year-old with polysubstance abuse, hepatitis C who presented to [facility] with short of breath. Found to have influenza A and multifocal pneumonia. He was also in atrial fibrillation with rapid ventricular rate. Patient's laboratory workup on admission was significant for hyperglycemia, creatinine of 2, phosphorus 6.9, AST/ALT of 3/1, procalcitonin greater than 85, lactate greater than 10. He was started on high flow nasal cannula, diltiazem, admitted to MICU. Patient was treated for influenza A, community-acquired pneumonia, acute hypoxic respiratory failure requiring supplemental oxygen, sepsis without shock and new onset A-fib RVR-in NSR. Patient continued to deteriorated and discussions were made with the family regarding comfort care measures. Patient was pronounced  at 1540. No spontaneous movements were present.  There was no response to verbal or tactile stimuli.  Pupils were dilated and fixed.  Corneal reflex absent.  No breath sounds were auscultated.  No carotid pulse was palpable.  No heart sounds were auscultated over entire precordium. Patient pronounced at 1540 on 1/15/24.    Outpatient Follow-Up  No future appointments.      Alber Echevarria DO, PhD  Internal Medicine PGY1

## 2024-01-15 NOTE — SIGNIFICANT EVENT
Called to bedside to assess the patient.  Per nursing patient went to CT scan on nonrebreather and was saturating okay before he left.  They laid him flat to perform the CT of his head due to concern for increased encephalopathy.  After the scan was completed patient had desaturated and they brought him straight back to the ICU where they placed him on NIPPV.  He is on BiPAP with 100% FiO2 and his peripheral saturation continues to show 88 to 90%.  Patient does have an increased work of breathing on NIPPV with a respiratory rate greater than 30.  There is concern that the patient will likely tire out.  He continues to be encephalopathic and not redirectable.  He is currently on Precedex and ketamine for sedation.  He did receive a dose of his as needed phenobarbital in order to treat his agitation but this did not help.  I did discuss with the patient's sister-in-law Meena and his son Aman and they are coming in to be at bedside with the patient.  While further goals of care discussion when they arrive.  Per the family they would likely want to withdraw care and continue with comfort care measures.  Will continue to support the patient as best as possible until family arrives.    Gerardo Slade MD

## 2024-01-15 NOTE — PROGRESS NOTES
Aman Leija is a 68 y.o. male on day 6 of admission presenting with Influenza A.    SW received message from team to have hospice referral placed following GOC discussion.  JOE spoke with lalo's sister Em who stated it would be best to contact the son Aman Leija [phone].  SW contacted the son and explained some of the hospice procedures.  A [facility] was [facility] to [facility] this morning.  Doctors were notified and updated.  JOE will continue to follow up and assist as needed.    GABBY PRESLEY LCSW

## 2024-01-15 NOTE — PROGRESS NOTES
Patient's family at bedside currently; son, 2 sisters, sister-in-law.  We did have a long discussion about the patient's progress throughout his hospital course and his his continued deterioration.  Family mentioned that the patient would not want to be in the state and would not want him to suffer.  Patient is currently on NIPPV requiring 100% FiO2 to maintain saturations of 90% and a decent PaO2.  Patient has been encephalopathic since his second day of admission.  His son made it a point to say that his father would not want aggressive measures and would not want to be on a ventilator or have heroics performed if his heart was to stop.  I discussed options with him currently.  We could either remain on NIPPV and support him throughout the night and see where he ends up in the morning or we can withdraw care and make him as comfortable as possible transfer over to comfort care measures.  Family discussed and are in agreement that the patient would benefit from comfort care measures only.  I discussed comfort care measures with them and advised him that we would give pain medication for pain and air hunger along with antianxiolytics to help with agitation.  Family agreed that this is the course they would like to take.  I did discuss this with the bedside nurse as well.  Orders will be placed and patient will be transition to comfort care.    Reviewed and approved by TINA JOHNSON on 1/15/24 at 12:02 AM.

## 2024-01-15 NOTE — PROGRESS NOTES
Aman Leija is a 68 y.o. male on day 6 of admission presenting with Influenza A.    Subjective   Patient seen and examined at bedside.  No acute changes overnight.    Objective     Last Recorded Vitals  /59   Pulse 98   Temp 36.4 °C (97.5 °F) (Temporal)   Resp 20   Wt 57.8 kg (127 lb 6.8 oz)   SpO2 (!) 83%   Intake/Output last 3 Shifts:    Intake/Output Summary (Last 24 hours) at 1/15/2024 1532  Last data filed at 1/14/2024 2013  Gross per 24 hour   Intake --   Output 1050 ml   Net -1050 ml     Admission Weight  Weight: 54.4 kg (120 lb) (01/09/24 0039)    Daily Weight  01/14/24 : 57.8 kg (127 lb 6.8 oz)    Image Results  CT head wo IV contrast  Narrative: Interpreted By:  Eric Ha,   STUDY:  CT HEAD WO IV CONTRAST;  1/14/2024 9:16 pm      INDICATION:  Signs/Symptoms:encephalopathy.      COMPARISON:  None.      ACCESSION NUMBER(S):  [id number]      ORDERING CLINICIAN:  GONZALO GUY      TECHNIQUE:  Noncontrast axial CT images of head were obtained with coronal and  sagittal reconstructed images.      FINDINGS:  BRAIN PARENCHYMA: Mild periventricular and subcortical hemispheric  white matter hypodensities are most compatible with chronic small  vessel ischemic disease. No acute intraparenchymal hemorrhage or  parenchymal evidence of acute large territory ischemic infarct. No  mass-effect. Gray-white matter distinction is preserved.      VENTRICLES and EXTRA-AXIAL SPACES:  No acute extra-axial or  intraventricular hemorrhage. No effacement of cerebral sulci.  Ventricles and sulci are age-concordant.      PARANASAL SINUSES/MASTOIDS:  No hemorrhage or air-fluid levels within  the visualized paranasal sinuses. The mastoids are well aerated.      CALVARIUM/ORBITS:  No skull fracture.  The orbits and globes are  intact to the extent visualized.      EXTRACRANIAL SOFT TISSUES: No discernible abnormality.      Impression: 1. No acute intracranial abnormality.      2. Mild burden of supratentorial  chronic small vessel ischemic  disease.          MACRO:  None.      Signed by: Eric Ha 1/14/2024 9:39 PM  Dictation workstation:   [id number]    Physical Exam  Constitutional: Frail, cachectic, sleeping comfortably  ENMT: mucous membranes dry, conjunctivae clear  Head/Neck: normocephalic, atraumatic; supple, trachea midline  Respiratory/Thorax: patent airways, tachypneic  Cardiovascular: RRR, no murmur  Gastrointestinal: soft, nondistended, non-tender, bowel sounds appreciated  Extremities: palpable peripheral pulses, no edema or cyanosis  Neurological: Unable to assess properly due to patient being sedated with Precedex  Psychological: Unable to assess appropriately  Skin: warm and dry    Assessment/Plan   Aman Leija is a 68 y.o. male with a history of hepatitis C, polysubstance abuse who presented to [facility] complaining of shortness of breath.    Acute:  #Influenza A  #Community-acquired pneumonia  #Acute hypoxic respiratory failure requiring supplemental oxygen  #Sepsis without shock, Streptococcus pneumonia bacteremia and pneumonia  #New onset A-fib RVR-in NSR  #ZAMZAM-improving  -Comfort care, continue supplemental oxygen, morphine infusion and ativan PRN  -Consult to hospice pending      #Acute alcohol withdrawal  -phenobarb PRN    Chronic:  #History of hepatitis C  #History of polysubstance abuse  -Continue buprenorphine 8 mg twice daily  -Continue nicotine patch  -CIWA protocol  -Continue thiamine     DVT prophylaxis: -  Diet: Clear liquid  CODE STATUS: DNR/DNI    Alber Echevarria DO, PhD  Internal Medicine PGY1

## 2024-01-16 LAB — FSP PPP-MCNC: > 20 UG/ML

## 2024-01-16 NOTE — NURSING NOTE
Death within 24 hours of utilization of soft wrist restraints logged in [facility] 1.16.24 @ 06:35 per CMS requirements.

## 2024-12-18 NOTE — PROGRESS NOTES
01/12/24 1450   Discharge Planning   Living Arrangements Alone   Support Systems Family members   Assistance Needed independent at baseline     Patient is currently sedated on Precedex, on continuous Bipap, in isolation for Flu.  Care Coordination team following for assistance with discharge planning as appropriate.  Taryn LOYA TCC   show